# Patient Record
Sex: MALE | Race: WHITE | NOT HISPANIC OR LATINO | Employment: OTHER | URBAN - METROPOLITAN AREA
[De-identification: names, ages, dates, MRNs, and addresses within clinical notes are randomized per-mention and may not be internally consistent; named-entity substitution may affect disease eponyms.]

---

## 2020-01-01 ENCOUNTER — CLINICAL SUPPORT (OUTPATIENT)
Dept: CARDIOLOGY | Facility: HOSPITAL | Age: 75
DRG: 811 | End: 2020-01-01
Attending: INTERNAL MEDICINE
Payer: MEDICARE

## 2020-01-01 ENCOUNTER — HOSPITAL ENCOUNTER (INPATIENT)
Facility: HOSPITAL | Age: 75
LOS: 4 days | Discharge: LEFT BEFORE DISCHARGE | DRG: 811 | End: 2020-09-02
Attending: EMERGENCY MEDICINE | Admitting: INTERNAL MEDICINE
Payer: MEDICARE

## 2020-01-01 VITALS
DIASTOLIC BLOOD PRESSURE: 65 MMHG | HEART RATE: 110 BPM | RESPIRATION RATE: 18 BRPM | SYSTOLIC BLOOD PRESSURE: 147 MMHG | BODY MASS INDEX: 21.72 KG/M2 | HEIGHT: 70 IN | TEMPERATURE: 98 F | OXYGEN SATURATION: 96 % | WEIGHT: 151.69 LBS

## 2020-01-01 DIAGNOSIS — D52.0 ANEMIA, MACROCYTIC, NUTRITIONAL: ICD-10-CM

## 2020-01-01 DIAGNOSIS — R07.9 CHEST PAIN: Primary | ICD-10-CM

## 2020-01-01 DIAGNOSIS — K92.2 GASTROINTESTINAL HEMORRHAGE, UNSPECIFIED GASTROINTESTINAL HEMORRHAGE TYPE: ICD-10-CM

## 2020-01-01 DIAGNOSIS — D75.89 BICYTOPENIA: ICD-10-CM

## 2020-01-01 DIAGNOSIS — D69.6 THROMBOCYTOPENIA: ICD-10-CM

## 2020-01-01 DIAGNOSIS — I21.4 NSTEMI (NON-ST ELEVATED MYOCARDIAL INFARCTION): ICD-10-CM

## 2020-01-01 DIAGNOSIS — D72.89 ATYPICAL LYMPHOCYTES PRESENT ON PERIPHERAL BLOOD SMEAR: ICD-10-CM

## 2020-01-01 DIAGNOSIS — D64.9 ANEMIA, UNSPECIFIED TYPE: ICD-10-CM

## 2020-01-01 DIAGNOSIS — N17.9 ACUTE KIDNEY INJURY: ICD-10-CM

## 2020-01-01 DIAGNOSIS — D75.89 BICYTOPENIA: Primary | ICD-10-CM

## 2020-01-01 DIAGNOSIS — R63.4 WEIGHT LOSS: ICD-10-CM

## 2020-01-01 DIAGNOSIS — R94.31 ABNORMAL EKG: ICD-10-CM

## 2020-01-01 DIAGNOSIS — R13.10 DYSPHAGIA, UNSPECIFIED TYPE: ICD-10-CM

## 2020-01-01 DIAGNOSIS — D51.8 ANEMIA OF DECREASED VITAMIN B12 ABSORPTION: ICD-10-CM

## 2020-01-01 LAB
ABO + RH BLD: NORMAL
ALBUMIN SERPL BCP-MCNC: 1.8 G/DL (ref 3.5–5.2)
ALBUMIN SERPL BCP-MCNC: 2 G/DL (ref 3.5–5.2)
ALBUMIN SERPL BCP-MCNC: 2.2 G/DL (ref 3.5–5.2)
ALBUMIN SERPL ELPH-MCNC: 2.2 G/DL (ref 2.9–4.4)
ALBUMIN/GLOB SERPL: 0.3 {RATIO} (ref 0.7–1.7)
ALP SERPL-CCNC: 170 U/L (ref 55–135)
ALP SERPL-CCNC: 182 U/L (ref 55–135)
ALP SERPL-CCNC: 213 U/L (ref 55–135)
ALPHA1 GLOB SERPL ELPH-MCNC: 0.2 G/DL (ref 0–0.4)
ALPHA2 GLOB SERPL ELPH-MCNC: 0.8 G/DL (ref 0.4–1)
ALT SERPL W/O P-5'-P-CCNC: 17 U/L (ref 10–44)
ALT SERPL W/O P-5'-P-CCNC: 18 U/L (ref 10–44)
ALT SERPL W/O P-5'-P-CCNC: 21 U/L (ref 10–44)
ANA TITR SER IF: NEGATIVE {TITER}
ANION GAP SERPL CALC-SCNC: 5 MMOL/L (ref 8–16)
ANION GAP SERPL CALC-SCNC: 5 MMOL/L (ref 8–16)
ANION GAP SERPL CALC-SCNC: 6 MMOL/L (ref 8–16)
ANISOCYTOSIS BLD QL SMEAR: SLIGHT
AORTIC ROOT ANNULUS: 2.52 CM
AORTIC VALVE CUSP SEPERATION: 1.46 CM
AST SERPL-CCNC: 26 U/L (ref 10–40)
AST SERPL-CCNC: 30 U/L (ref 10–40)
AST SERPL-CCNC: 30 U/L (ref 10–40)
AV INDEX (PROSTH): 0.49
AV MEAN GRADIENT: 12 MMHG
AV PEAK GRADIENT: 23 MMHG
AV VALVE AREA: 1.63 CM2
AV VELOCITY RATIO: 47.67
B-GLOBULIN SERPL ELPH-MCNC: 1.4 G/DL (ref 0.7–1.3)
BACTERIA #/AREA URNS HPF: NEGATIVE /HPF
BACTERIA #/AREA URNS HPF: NEGATIVE /HPF
BACTERIA UR CULT: NO GROWTH
BASOPHILS NFR BLD: 0 % (ref 0–1.9)
BILIRUB SERPL-MCNC: 0.7 MG/DL (ref 0.1–1)
BILIRUB SERPL-MCNC: 0.7 MG/DL (ref 0.1–1)
BILIRUB SERPL-MCNC: 0.9 MG/DL (ref 0.1–1)
BLD GP AB SCN CELLS X3 SERPL QL: NORMAL
BLD PROD TYP BPU: NORMAL
BLOOD UNIT EXPIRATION DATE: NORMAL
BLOOD UNIT TYPE CODE: 5100
BLOOD UNIT TYPE: NORMAL
BNP SERPL-MCNC: 234 PG/ML (ref 0–99)
BSA FOR ECHO PROCEDURE: 1.82 M2
BUN SERPL-MCNC: 23 MG/DL (ref 8–23)
BUN SERPL-MCNC: 25 MG/DL (ref 8–23)
BUN SERPL-MCNC: 31 MG/DL (ref 8–23)
CALCIUM SERPL-MCNC: 8 MG/DL (ref 8.7–10.5)
CALCIUM SERPL-MCNC: 8.1 MG/DL (ref 8.7–10.5)
CALCIUM SERPL-MCNC: 8.3 MG/DL (ref 8.7–10.5)
CANCER AG125 SERPL-ACNC: 20.2 U/ML
CEA SERPL-MCNC: 1.6 NG/ML (ref 0–5)
CHLORIDE SERPL-SCNC: 101 MMOL/L (ref 95–110)
CHLORIDE SERPL-SCNC: 105 MMOL/L (ref 95–110)
CHLORIDE SERPL-SCNC: 108 MMOL/L (ref 95–110)
CHOLEST SERPL-MCNC: 84 MG/DL (ref 120–199)
CHOLEST/HDLC SERPL: 6 {RATIO} (ref 2–5)
CK MB SERPL-MCNC: 1.4 NG/ML (ref 0.1–6.5)
CK SERPL-CCNC: 13 U/L (ref 20–200)
CO2 SERPL-SCNC: 20 MMOL/L (ref 23–29)
CO2 SERPL-SCNC: 22 MMOL/L (ref 23–29)
CO2 SERPL-SCNC: 25 MMOL/L (ref 23–29)
CODING SYSTEM: NORMAL
CREAT SERPL-MCNC: 1.5 MG/DL (ref 0.5–1.4)
CREAT SERPL-MCNC: 1.5 MG/DL (ref 0.5–1.4)
CREAT SERPL-MCNC: 2 MG/DL (ref 0.5–1.4)
CRP SERPL-MCNC: 3.96 MG/DL
CV ECHO LV RWT: 0.31 CM
DAT IGG-SP REAG RBC-IMP: NORMAL
DIFFERENTIAL METHOD: ABNORMAL
DISPENSE STATUS: NORMAL
DOP CALC AO PEAK VEL: 2.41 M/S
DOP CALC AO VTI: 52.11 CM
DOP CALC LVOT AREA: 3.3 CM2
DOP CALC LVOT DIAMETER: 2.05 CM
DOP CALC LVOT PEAK VEL: 114.89 M/S
DOP CALC LVOT STROKE VOLUME: 84.78 CM3
DOP CALCLVOT PEAK VEL VTI: 25.7 CM
E WAVE DECELERATION TIME: 258.99 MSEC
E/A RATIO: 1.03
E/E' RATIO: 12.6 M/S
ECHO LV POSTERIOR WALL: 0.96 CM (ref 0.6–1.1)
EOSINOPHIL NFR BLD: 0 % (ref 0–8)
EPO SERPL-ACNC: 106.1 MIU/ML (ref 2.6–18.5)
ERYTHROCYTE [DISTWIDTH] IN BLOOD BY AUTOMATED COUNT: 20.8 % (ref 11.5–14.5)
ERYTHROCYTE [DISTWIDTH] IN BLOOD BY AUTOMATED COUNT: 20.8 % (ref 11.5–14.5)
ERYTHROCYTE [DISTWIDTH] IN BLOOD BY AUTOMATED COUNT: 21.4 % (ref 11.5–14.5)
ERYTHROCYTE [DISTWIDTH] IN BLOOD BY AUTOMATED COUNT: 21.4 % (ref 11.5–14.5)
ERYTHROCYTE [DISTWIDTH] IN BLOOD BY AUTOMATED COUNT: 21.5 % (ref 11.5–14.5)
ERYTHROCYTE [DISTWIDTH] IN BLOOD BY AUTOMATED COUNT: 21.9 % (ref 11.5–14.5)
EST. GFR  (AFRICAN AMERICAN): 37.2 ML/MIN/1.73 M^2
EST. GFR  (AFRICAN AMERICAN): 52.6 ML/MIN/1.73 M^2
EST. GFR  (AFRICAN AMERICAN): 52.6 ML/MIN/1.73 M^2
EST. GFR  (NON AFRICAN AMERICAN): 32.2 ML/MIN/1.73 M^2
EST. GFR  (NON AFRICAN AMERICAN): 45.5 ML/MIN/1.73 M^2
EST. GFR  (NON AFRICAN AMERICAN): 45.5 ML/MIN/1.73 M^2
FERRITIN SERPL-MCNC: 211 NG/ML (ref 20–300)
FOLATE SERPL-MCNC: 19.3 NG/ML (ref 4–24)
FRACTIONAL SHORTENING: 19 % (ref 28–44)
GAMMA GLOB SERPL ELPH-MCNC: 4.2 G/DL (ref 0.4–1.8)
GLOBULIN SER CALC-MCNC: 6.6 G/DL (ref 2.2–3.9)
GLUCOSE SERPL-MCNC: 104 MG/DL (ref 70–110)
GLUCOSE SERPL-MCNC: 108 MG/DL (ref 70–110)
GLUCOSE SERPL-MCNC: 111 MG/DL (ref 70–110)
GLUCOSE SERPL-MCNC: 114 MG/DL (ref 70–110)
GLUCOSE SERPL-MCNC: 114 MG/DL (ref 70–110)
GLUCOSE SERPL-MCNC: 142 MG/DL (ref 70–110)
GLUCOSE SERPL-MCNC: 143 MG/DL (ref 70–110)
GLUCOSE SERPL-MCNC: 210 MG/DL (ref 70–110)
GLUCOSE SERPL-MCNC: 215 MG/DL (ref 70–110)
GLUCOSE SERPL-MCNC: 81 MG/DL (ref 70–110)
GLUCOSE SERPL-MCNC: 89 MG/DL (ref 70–110)
GLUCOSE SERPL-MCNC: 89 MG/DL (ref 70–110)
GLUCOSE SERPL-MCNC: 90 MG/DL (ref 70–110)
GLUCOSE SERPL-MCNC: 99 MG/DL (ref 70–110)
HAPTOGLOB SERPL-MCNC: 237 MG/DL (ref 34–355)
HCT VFR BLD AUTO: 21.8 % (ref 40–54)
HCT VFR BLD AUTO: 24.2 % (ref 40–54)
HCT VFR BLD AUTO: 25.2 % (ref 40–54)
HCT VFR BLD AUTO: 25.5 % (ref 40–54)
HCT VFR BLD AUTO: 25.7 % (ref 40–54)
HCT VFR BLD AUTO: 25.9 % (ref 40–54)
HCT VFR BLD AUTO: 26.6 % (ref 40–54)
HDLC SERPL-MCNC: 14 MG/DL (ref 40–75)
HDLC SERPL: 16.7 % (ref 20–50)
HGB BLD-MCNC: 6.9 G/DL (ref 14–18)
HGB BLD-MCNC: 7.8 G/DL (ref 14–18)
HGB BLD-MCNC: 8.1 G/DL (ref 14–18)
HGB BLD-MCNC: 8.1 G/DL (ref 14–18)
HGB BLD-MCNC: 8.2 G/DL (ref 14–18)
HGB BLD-MCNC: 8.2 G/DL (ref 14–18)
HGB BLD-MCNC: 8.4 G/DL (ref 14–18)
HYALINE CASTS #/AREA URNS LPF: 5 /LPF
HYALINE CASTS #/AREA URNS LPF: 5 /LPF
IGA SERPL-MCNC: 1227 MG/DL (ref 61–437)
IGA SERPL-MCNC: 1227 MG/DL (ref 61–437)
IGG SERPL-MCNC: 4513 MG/DL (ref 603–1613)
IGG SERPL-MCNC: 4513 MG/DL (ref 603–1613)
IGM SERPL-MCNC: 45 MG/DL (ref 15–143)
IGM SERPL-MCNC: 45 MG/DL (ref 15–143)
IMM GRANULOCYTES # BLD AUTO: ABNORMAL K/UL (ref 0–0.04)
IMM GRANULOCYTES NFR BLD AUTO: ABNORMAL % (ref 0–0.5)
INTERVENTRICULAR SEPTUM: 0.96 CM (ref 0.6–1.1)
IRON SERPL-MCNC: 164 UG/DL (ref 45–160)
KAPPA LC FREE SER-MCNC: 520.4 MG/L (ref 3.3–19.4)
KAPPA LC FREE/LAMBDA FREE SER: 1.8 {RATIO} (ref 0.26–1.65)
LABCORP MISC TEST CODE: 1453
LABCORP MISC TEST NAME: NORMAL
LABCORP MISCELLANEOUS TEST: NORMAL
LABORATORY COMMENT REPORT: ABNORMAL
LAMBDA LC FREE SERPL-MCNC: 289.5 MG/L (ref 5.7–26.3)
LDH SERPL L TO P-CCNC: 259 U/L (ref 110–260)
LDLC SERPL CALC-MCNC: 43.2 MG/DL (ref 63–159)
LEFT ATRIUM SIZE: 4.59 CM
LEFT INTERNAL DIMENSION IN SYSTOLE: 4.94 CM (ref 2.1–4)
LEFT VENTRICLE MASS INDEX: 131 G/M2
LEFT VENTRICULAR INTERNAL DIMENSION IN DIASTOLE: 6.1 CM (ref 3.5–6)
LEFT VENTRICULAR MASS: 240.94 G
LV LATERAL E/E' RATIO: 10.5 M/S
LV SEPTAL E/E' RATIO: 15.75 M/S
LYMPHOCYTES NFR BLD: 0 % (ref 18–48)
LYMPHOCYTES NFR BLD: 58 % (ref 18–48)
LYMPHOCYTES NFR BLD: 67 % (ref 18–48)
LYMPHOCYTES NFR BLD: 67 % (ref 18–48)
LYMPHOCYTES NFR BLD: 68 % (ref 18–48)
LYMPHOCYTES NFR BLD: 77 % (ref 18–48)
M PROTEIN SERPL ELPH-MCNC: 0.9 G/DL
MAGNESIUM SERPL-MCNC: 1.8 MG/DL (ref 1.6–2.6)
MCH RBC QN AUTO: 32.5 PG (ref 27–31)
MCH RBC QN AUTO: 32.9 PG (ref 27–31)
MCH RBC QN AUTO: 33.2 PG (ref 27–31)
MCH RBC QN AUTO: 33.2 PG (ref 27–31)
MCH RBC QN AUTO: 33.3 PG (ref 27–31)
MCH RBC QN AUTO: 34 PG (ref 27–31)
MCHC RBC AUTO-ENTMCNC: 31.6 G/DL (ref 32–36)
MCHC RBC AUTO-ENTMCNC: 31.7 G/DL (ref 32–36)
MCHC RBC AUTO-ENTMCNC: 31.7 G/DL (ref 32–36)
MCHC RBC AUTO-ENTMCNC: 31.8 G/DL (ref 32–36)
MCHC RBC AUTO-ENTMCNC: 32.1 G/DL (ref 32–36)
MCHC RBC AUTO-ENTMCNC: 32.2 G/DL (ref 32–36)
MCV RBC AUTO: 102 FL (ref 82–98)
MCV RBC AUTO: 103 FL (ref 82–98)
MCV RBC AUTO: 103 FL (ref 82–98)
MCV RBC AUTO: 104 FL (ref 82–98)
MCV RBC AUTO: 105 FL (ref 82–98)
MCV RBC AUTO: 107 FL (ref 82–98)
MICROSCOPIC COMMENT: ABNORMAL
MICROSCOPIC COMMENT: ABNORMAL
MONOCYTES NFR BLD: 0 % (ref 4–15)
MONOCYTES NFR BLD: 2 % (ref 4–15)
MONOCYTES NFR BLD: 5 % (ref 4–15)
MONOCYTES NFR BLD: 6 % (ref 4–15)
MONOCYTES NFR BLD: 8 % (ref 4–15)
MONOCYTES NFR BLD: 8 % (ref 4–15)
MPV, BLUE TOP: 8.7 FL (ref 9.2–12.9)
MV PEAK A VEL: 1.22 M/S
MV PEAK E VEL: 1.26 M/S
NEUTROPHILS NFR BLD: 0 % (ref 38–73)
NEUTROPHILS NFR BLD: 15 % (ref 38–73)
NEUTROPHILS NFR BLD: 18 % (ref 38–73)
NEUTROPHILS NFR BLD: 24 % (ref 38–73)
NEUTROPHILS NFR BLD: 25 % (ref 38–73)
NEUTROPHILS NFR BLD: 34 % (ref 38–73)
NEUTS BAND NFR BLD MANUAL: 2 %
NEUTS BAND NFR BLD MANUAL: 4 %
NEUTS BAND NFR BLD MANUAL: 6 %
NONHDLC SERPL-MCNC: 70 MG/DL
NRBC BLD-RTO: 1 /100 WBC
NRBC BLD-RTO: 2 /100 WBC
NUM UNITS TRANS PACKED RBC: NORMAL
OB PNL STL: NEGATIVE
PISA TR MAX VEL: 3.34 M/S
PLATELET # BLD AUTO: 31 K/UL (ref 150–350)
PLATELET # BLD AUTO: 37 K/UL (ref 150–350)
PLATELET # BLD AUTO: 37 K/UL (ref 150–350)
PLATELET # BLD AUTO: 39 K/UL (ref 150–350)
PLATELET # BLD AUTO: 39 K/UL (ref 150–350)
PLATELET # BLD AUTO: 49 K/UL (ref 150–350)
PLATELET BLD QL SMEAR: ABNORMAL
PLATELET, BLUE TOP: 33 K/UL (ref 150–350)
PMV BLD AUTO: 10.5 FL (ref 9.2–12.9)
PMV BLD AUTO: 10.9 FL (ref 9.2–12.9)
PMV BLD AUTO: 9.2 FL (ref 9.2–12.9)
PMV BLD AUTO: 9.5 FL (ref 9.2–12.9)
PMV BLD AUTO: 9.5 FL (ref 9.2–12.9)
PMV BLD AUTO: 9.9 FL (ref 9.2–12.9)
POTASSIUM SERPL-SCNC: 4.1 MMOL/L (ref 3.5–5.1)
POTASSIUM SERPL-SCNC: 4.4 MMOL/L (ref 3.5–5.1)
POTASSIUM SERPL-SCNC: 4.5 MMOL/L (ref 3.5–5.1)
PROMYELOCYTES NFR BLD MANUAL: 0 %
PROT PATTERN SERPL IFE-IMP: ABNORMAL
PROT SERPL-MCNC: 10.1 G/DL (ref 6–8.4)
PROT SERPL-MCNC: 8.7 G/DL (ref 6–8.4)
PROT SERPL-MCNC: 8.8 G/DL (ref 6–8.5)
PROT SERPL-MCNC: 9.4 G/DL (ref 6–8.4)
PV PEAK VELOCITY: 108.53 CM/S
RA PRESSURE: 3 MMHG
RBC # BLD AUTO: 2.03 M/UL (ref 4.6–6.2)
RBC # BLD AUTO: 2.35 M/UL (ref 4.6–6.2)
RBC # BLD AUTO: 2.44 M/UL (ref 4.6–6.2)
RBC # BLD AUTO: 2.46 M/UL (ref 4.6–6.2)
RBC # BLD AUTO: 2.49 M/UL (ref 4.6–6.2)
RBC # BLD AUTO: 2.55 M/UL (ref 4.6–6.2)
RBC #/AREA URNS HPF: 4 /HPF (ref 0–4)
RBC #/AREA URNS HPF: 4 /HPF (ref 0–4)
RETICS/RBC NFR AUTO: 4.1 % (ref 0.4–2)
RHEUMATOID FACT SERPL-ACNC: <10 IU/ML (ref 0–13.9)
SARS-COV-2 RDRP RESP QL NAA+PROBE: NEGATIVE
SATURATED IRON: 92 % (ref 20–50)
SMUDGE CELLS BLD QL SMEAR: PRESENT
SODIUM SERPL-SCNC: 131 MMOL/L (ref 136–145)
SODIUM SERPL-SCNC: 133 MMOL/L (ref 136–145)
SODIUM SERPL-SCNC: 133 MMOL/L (ref 136–145)
SQUAMOUS #/AREA URNS HPF: 1 /HPF
SQUAMOUS #/AREA URNS HPF: 1 /HPF
TDI LATERAL: 0.12 M/S
TDI SEPTAL: 0.08 M/S
TDI: 0.1 M/S
TOTAL IRON BINDING CAPACITY: 178 UG/DL (ref 250–450)
TR MAX PG: 45 MMHG
TRANSFERRIN SERPL-MCNC: 127 MG/DL (ref 200–375)
TRIGL SERPL-MCNC: 134 MG/DL (ref 30–150)
TROPONIN I SERPL DL<=0.01 NG/ML-MCNC: 0.06 NG/ML
TROPONIN I SERPL DL<=0.01 NG/ML-MCNC: 0.07 NG/ML
TROPONIN I SERPL DL<=0.01 NG/ML-MCNC: 0.1 NG/ML
TSH SERPL DL<=0.005 MIU/L-ACNC: 4.76 UIU/ML (ref 0.34–5.6)
TV REST PULMONARY ARTERY PRESSURE: 48 MMHG
VIT B12 SERPL-MCNC: 213 PG/ML (ref 210–950)
VIT B6 SERPL-MCNC: 2.6 UG/L (ref 5.3–46.7)
WBC # BLD AUTO: 11.38 K/UL (ref 3.9–12.7)
WBC # BLD AUTO: 6.53 K/UL (ref 3.9–12.7)
WBC # BLD AUTO: 6.78 K/UL (ref 3.9–12.7)
WBC # BLD AUTO: 6.84 K/UL (ref 3.9–12.7)
WBC # BLD AUTO: 6.88 K/UL (ref 3.9–12.7)
WBC # BLD AUTO: 9.2 K/UL (ref 3.9–12.7)
WBC #/AREA URNS HPF: 1 /HPF (ref 0–5)
WBC #/AREA URNS HPF: 1 /HPF (ref 0–5)
WBC OTHER NFR BLD MANUAL: 6 %

## 2020-01-01 PROCEDURE — 84443 ASSAY THYROID STIM HORMONE: CPT

## 2020-01-01 PROCEDURE — 85018 HEMOGLOBIN: CPT

## 2020-01-01 PROCEDURE — 84484 ASSAY OF TROPONIN QUANT: CPT | Mod: 91

## 2020-01-01 PROCEDURE — 85007 BL SMEAR W/DIFF WBC COUNT: CPT

## 2020-01-01 PROCEDURE — 85045 AUTOMATED RETICULOCYTE COUNT: CPT

## 2020-01-01 PROCEDURE — 85027 COMPLETE CBC AUTOMATED: CPT

## 2020-01-01 PROCEDURE — 25000003 PHARM REV CODE 250: Performed by: EMERGENCY MEDICINE

## 2020-01-01 PROCEDURE — 82784 ASSAY IGA/IGD/IGG/IGM EACH: CPT | Mod: 91

## 2020-01-01 PROCEDURE — C9113 INJ PANTOPRAZOLE SODIUM, VIA: HCPCS | Performed by: INTERNAL MEDICINE

## 2020-01-01 PROCEDURE — 63600175 PHARM REV CODE 636 W HCPCS: Performed by: INTERNAL MEDICINE

## 2020-01-01 PROCEDURE — 87086 URINE CULTURE/COLONY COUNT: CPT

## 2020-01-01 PROCEDURE — 93306 ECHO (CUPID ONLY): ICD-10-PCS | Mod: 26,,, | Performed by: INTERNAL MEDICINE

## 2020-01-01 PROCEDURE — 85014 HEMATOCRIT: CPT

## 2020-01-01 PROCEDURE — 25000003 PHARM REV CODE 250: Performed by: INTERNAL MEDICINE

## 2020-01-01 PROCEDURE — 21400001 HC TELEMETRY ROOM

## 2020-01-01 PROCEDURE — 30000890 HC MISC. SEND OUT TEST

## 2020-01-01 PROCEDURE — 80053 COMPREHEN METABOLIC PANEL: CPT

## 2020-01-01 PROCEDURE — 84207 ASSAY OF VITAMIN B-6: CPT

## 2020-01-01 PROCEDURE — 82962 GLUCOSE BLOOD TEST: CPT

## 2020-01-01 PROCEDURE — 86038 ANTINUCLEAR ANTIBODIES: CPT

## 2020-01-01 PROCEDURE — 84165 PROTEIN E-PHORESIS SERUM: CPT

## 2020-01-01 PROCEDURE — 85397 CLOTTING FUNCT ACTIVITY: CPT

## 2020-01-01 PROCEDURE — 83010 ASSAY OF HAPTOGLOBIN QUANT: CPT

## 2020-01-01 PROCEDURE — 36415 COLL VENOUS BLD VENIPUNCTURE: CPT

## 2020-01-01 PROCEDURE — 83036 HEMOGLOBIN GLYCOSYLATED A1C: CPT

## 2020-01-01 PROCEDURE — 82668 ASSAY OF ERYTHROPOIETIN: CPT

## 2020-01-01 PROCEDURE — P9016 RBC LEUKOCYTES REDUCED: HCPCS

## 2020-01-01 PROCEDURE — 99285 EMERGENCY DEPT VISIT HI MDM: CPT | Mod: 25

## 2020-01-01 PROCEDURE — 86431 RHEUMATOID FACTOR QUANT: CPT

## 2020-01-01 PROCEDURE — 36430 TRANSFUSION BLD/BLD COMPNT: CPT

## 2020-01-01 PROCEDURE — 83883 ASSAY NEPHELOMETRY NOT SPEC: CPT | Mod: 91

## 2020-01-01 PROCEDURE — 93005 ELECTROCARDIOGRAM TRACING: CPT | Performed by: INTERNAL MEDICINE

## 2020-01-01 PROCEDURE — 86023 IMMUNOGLOBULIN ASSAY: CPT

## 2020-01-01 PROCEDURE — 83540 ASSAY OF IRON: CPT

## 2020-01-01 PROCEDURE — 85027 COMPLETE CBC AUTOMATED: CPT | Mod: 91

## 2020-01-01 PROCEDURE — 82728 ASSAY OF FERRITIN: CPT

## 2020-01-01 PROCEDURE — 86140 C-REACTIVE PROTEIN: CPT

## 2020-01-01 PROCEDURE — 93306 TTE W/DOPPLER COMPLETE: CPT

## 2020-01-01 PROCEDURE — 81001 URINALYSIS AUTO W/SCOPE: CPT

## 2020-01-01 PROCEDURE — 85049 AUTOMATED PLATELET COUNT: CPT

## 2020-01-01 PROCEDURE — 82272 OCCULT BLD FECES 1-3 TESTS: CPT

## 2020-01-01 PROCEDURE — 83880 ASSAY OF NATRIURETIC PEPTIDE: CPT

## 2020-01-01 PROCEDURE — 86901 BLOOD TYPING SEROLOGIC RH(D): CPT

## 2020-01-01 PROCEDURE — 30000890 MISCELLANEOUS SENDOUT TEST, BLOOD

## 2020-01-01 PROCEDURE — 82607 VITAMIN B-12: CPT

## 2020-01-01 PROCEDURE — 86022 PLATELET ANTIBODIES: CPT | Mod: 59

## 2020-01-01 PROCEDURE — 82746 ASSAY OF FOLIC ACID SERUM: CPT

## 2020-01-01 PROCEDURE — 86920 COMPATIBILITY TEST SPIN: CPT

## 2020-01-01 PROCEDURE — U0002 COVID-19 LAB TEST NON-CDC: HCPCS

## 2020-01-01 PROCEDURE — 82378 CARCINOEMBRYONIC ANTIGEN: CPT

## 2020-01-01 PROCEDURE — 93306 TTE W/DOPPLER COMPLETE: CPT | Mod: 26,,, | Performed by: INTERNAL MEDICINE

## 2020-01-01 PROCEDURE — 85025 COMPLETE CBC W/AUTO DIFF WBC: CPT

## 2020-01-01 PROCEDURE — 63600175 PHARM REV CODE 636 W HCPCS: Performed by: EMERGENCY MEDICINE

## 2020-01-01 PROCEDURE — 86022 PLATELET ANTIBODIES: CPT

## 2020-01-01 PROCEDURE — 83615 LACTATE (LD) (LDH) ENZYME: CPT

## 2020-01-01 PROCEDURE — 80061 LIPID PANEL: CPT

## 2020-01-01 PROCEDURE — 86880 COOMBS TEST DIRECT: CPT

## 2020-01-01 PROCEDURE — 83735 ASSAY OF MAGNESIUM: CPT

## 2020-01-01 PROCEDURE — 82550 ASSAY OF CK (CPK): CPT

## 2020-01-01 PROCEDURE — 82553 CREATINE MB FRACTION: CPT

## 2020-01-01 PROCEDURE — 86304 IMMUNOASSAY TUMOR CA 125: CPT

## 2020-01-01 PROCEDURE — 30000890 LABCORP MISCELLANEOUS TEST

## 2020-01-01 RX ORDER — GLUCAGON 1 MG
1 KIT INJECTION
Status: DISCONTINUED | OUTPATIENT
Start: 2020-01-01 | End: 2020-01-01 | Stop reason: HOSPADM

## 2020-01-01 RX ORDER — PANTOPRAZOLE SODIUM 40 MG/10ML
40 INJECTION, POWDER, LYOPHILIZED, FOR SOLUTION INTRAVENOUS DAILY
Status: DISCONTINUED | OUTPATIENT
Start: 2020-01-01 | End: 2020-01-01 | Stop reason: HOSPADM

## 2020-01-01 RX ORDER — ACETAMINOPHEN 325 MG/1
650 TABLET ORAL EVERY 8 HOURS PRN
Status: DISCONTINUED | OUTPATIENT
Start: 2020-01-01 | End: 2020-01-01 | Stop reason: HOSPADM

## 2020-01-01 RX ORDER — ATORVASTATIN CALCIUM 40 MG/1
40 TABLET, FILM COATED ORAL DAILY
Status: DISCONTINUED | OUTPATIENT
Start: 2020-01-01 | End: 2020-01-01 | Stop reason: HOSPADM

## 2020-01-01 RX ORDER — CYANOCOBALAMIN 1000 UG/ML
1000 INJECTION, SOLUTION INTRAMUSCULAR; SUBCUTANEOUS DAILY
Status: COMPLETED | OUTPATIENT
Start: 2020-01-01 | End: 2020-01-01

## 2020-01-01 RX ORDER — SODIUM CHLORIDE 9 MG/ML
INJECTION, SOLUTION INTRAVENOUS CONTINUOUS
Status: DISCONTINUED | OUTPATIENT
Start: 2020-01-01 | End: 2020-01-01

## 2020-01-01 RX ORDER — INSULIN ASPART 100 [IU]/ML
1-10 INJECTION, SOLUTION INTRAVENOUS; SUBCUTANEOUS
Status: DISCONTINUED | OUTPATIENT
Start: 2020-01-01 | End: 2020-01-01 | Stop reason: HOSPADM

## 2020-01-01 RX ORDER — HYDROCODONE BITARTRATE AND ACETAMINOPHEN 500; 5 MG/1; MG/1
TABLET ORAL
Status: DISCONTINUED | OUTPATIENT
Start: 2020-01-01 | End: 2020-01-01 | Stop reason: HOSPADM

## 2020-01-01 RX ORDER — IBUPROFEN 200 MG
24 TABLET ORAL
Status: DISCONTINUED | OUTPATIENT
Start: 2020-01-01 | End: 2020-01-01 | Stop reason: HOSPADM

## 2020-01-01 RX ORDER — CYANOCOBALAMIN 1000 UG/ML
1000 INJECTION, SOLUTION INTRAMUSCULAR; SUBCUTANEOUS DAILY
Status: DISCONTINUED | OUTPATIENT
Start: 2020-01-01 | End: 2020-01-01 | Stop reason: HOSPADM

## 2020-01-01 RX ORDER — TRAMADOL HYDROCHLORIDE 50 MG/1
50 TABLET ORAL EVERY 4 HOURS PRN
Status: DISCONTINUED | OUTPATIENT
Start: 2020-01-01 | End: 2020-01-01 | Stop reason: HOSPADM

## 2020-01-01 RX ORDER — METOPROLOL TARTRATE 50 MG/1
100 TABLET ORAL DAILY
Status: DISCONTINUED | OUTPATIENT
Start: 2020-01-01 | End: 2020-01-01 | Stop reason: HOSPADM

## 2020-01-01 RX ORDER — ASPIRIN 325 MG
325 TABLET ORAL
Status: COMPLETED | OUTPATIENT
Start: 2020-01-01 | End: 2020-01-01

## 2020-01-01 RX ORDER — SODIUM CHLORIDE 0.9 % (FLUSH) 0.9 %
10 SYRINGE (ML) INJECTION
Status: DISCONTINUED | OUTPATIENT
Start: 2020-01-01 | End: 2020-01-01 | Stop reason: HOSPADM

## 2020-01-01 RX ORDER — AMLODIPINE BESYLATE 5 MG/1
10 TABLET ORAL DAILY
Status: DISCONTINUED | OUTPATIENT
Start: 2020-01-01 | End: 2020-01-01 | Stop reason: HOSPADM

## 2020-01-01 RX ORDER — SODIUM CHLORIDE, SODIUM LACTATE, POTASSIUM CHLORIDE, CALCIUM CHLORIDE 600; 310; 30; 20 MG/100ML; MG/100ML; MG/100ML; MG/100ML
1000 INJECTION, SOLUTION INTRAVENOUS
Status: COMPLETED | OUTPATIENT
Start: 2020-01-01 | End: 2020-01-01

## 2020-01-01 RX ORDER — AMLODIPINE BESYLATE 10 MG/1
10 TABLET ORAL DAILY
COMMUNITY

## 2020-01-01 RX ORDER — IBUPROFEN 200 MG
16 TABLET ORAL
Status: DISCONTINUED | OUTPATIENT
Start: 2020-01-01 | End: 2020-01-01 | Stop reason: HOSPADM

## 2020-01-01 RX ADMIN — CYANOCOBALAMIN 1000 MCG: 1000 INJECTION, SOLUTION INTRAMUSCULAR at 09:08

## 2020-01-01 RX ADMIN — AMLODIPINE BESYLATE 10 MG: 5 TABLET ORAL at 08:08

## 2020-01-01 RX ADMIN — PANTOPRAZOLE SODIUM 40 MG: 40 INJECTION, POWDER, LYOPHILIZED, FOR SOLUTION INTRAVENOUS at 06:08

## 2020-01-01 RX ADMIN — INSULIN ASPART 4 UNITS: 100 INJECTION, SOLUTION INTRAVENOUS; SUBCUTANEOUS at 12:09

## 2020-01-01 RX ADMIN — ATORVASTATIN CALCIUM 40 MG: 40 TABLET, FILM COATED ORAL at 08:08

## 2020-01-01 RX ADMIN — METOPROLOL TARTRATE 100 MG: 50 TABLET, FILM COATED ORAL at 09:08

## 2020-01-01 RX ADMIN — METOPROLOL TARTRATE 100 MG: 50 TABLET, FILM COATED ORAL at 09:09

## 2020-01-01 RX ADMIN — PANTOPRAZOLE SODIUM 40 MG: 40 INJECTION, POWDER, LYOPHILIZED, FOR SOLUTION INTRAVENOUS at 09:09

## 2020-01-01 RX ADMIN — ACETAMINOPHEN 650 MG: 325 TABLET, FILM COATED ORAL at 08:09

## 2020-01-01 RX ADMIN — ATORVASTATIN CALCIUM 40 MG: 40 TABLET, FILM COATED ORAL at 09:09

## 2020-01-01 RX ADMIN — PANTOPRAZOLE SODIUM 40 MG: 40 INJECTION, POWDER, LYOPHILIZED, FOR SOLUTION INTRAVENOUS at 09:08

## 2020-01-01 RX ADMIN — AMLODIPINE BESYLATE 10 MG: 5 TABLET ORAL at 09:08

## 2020-01-01 RX ADMIN — METOPROLOL TARTRATE 100 MG: 50 TABLET, FILM COATED ORAL at 08:08

## 2020-01-01 RX ADMIN — METOPROLOL TARTRATE 100 MG: 50 TABLET, FILM COATED ORAL at 08:09

## 2020-01-01 RX ADMIN — ATORVASTATIN CALCIUM 40 MG: 40 TABLET, FILM COATED ORAL at 08:09

## 2020-01-01 RX ADMIN — CYANOCOBALAMIN 1000 MCG: 1000 INJECTION, SOLUTION INTRAMUSCULAR at 06:08

## 2020-01-01 RX ADMIN — ATORVASTATIN CALCIUM 40 MG: 40 TABLET, FILM COATED ORAL at 09:08

## 2020-01-01 RX ADMIN — AMLODIPINE BESYLATE 10 MG: 5 TABLET ORAL at 08:09

## 2020-01-01 RX ADMIN — ASPIRIN 325 MG ORAL TABLET 325 MG: 325 PILL ORAL at 01:08

## 2020-01-01 RX ADMIN — PANTOPRAZOLE SODIUM 40 MG: 40 INJECTION, POWDER, LYOPHILIZED, FOR SOLUTION INTRAVENOUS at 08:08

## 2020-01-01 RX ADMIN — CYANOCOBALAMIN 1000 MCG: 1000 INJECTION, SOLUTION INTRAMUSCULAR at 09:09

## 2020-01-01 RX ADMIN — CYANOCOBALAMIN 1000 MCG: 1000 INJECTION, SOLUTION INTRAMUSCULAR at 08:08

## 2020-01-01 RX ADMIN — PANTOPRAZOLE SODIUM 40 MG: 40 INJECTION, POWDER, LYOPHILIZED, FOR SOLUTION INTRAVENOUS at 08:09

## 2020-01-01 RX ADMIN — SODIUM CHLORIDE, SODIUM LACTATE, POTASSIUM CHLORIDE, AND CALCIUM CHLORIDE 1000 ML: .6; .31; .03; .02 INJECTION, SOLUTION INTRAVENOUS at 01:08

## 2020-01-01 RX ADMIN — SODIUM CHLORIDE: 0.9 INJECTION, SOLUTION INTRAVENOUS at 06:08

## 2020-01-01 RX ADMIN — AMLODIPINE BESYLATE 10 MG: 5 TABLET ORAL at 09:09

## 2020-01-01 RX ADMIN — CYANOCOBALAMIN 1000 MCG: 1000 INJECTION, SOLUTION INTRAMUSCULAR at 08:09

## 2020-08-29 PROBLEM — D75.89 BICYTOPENIA: Status: ACTIVE | Noted: 2020-01-01

## 2020-08-29 PROBLEM — I25.10 CAD (CORONARY ARTERY DISEASE): Status: ACTIVE | Noted: 2020-01-01

## 2020-08-29 PROBLEM — R94.31 ABNORMAL EKG: Status: ACTIVE | Noted: 2020-01-01

## 2020-08-29 PROBLEM — R07.9 CHEST PAIN: Status: ACTIVE | Noted: 2020-01-01

## 2020-08-29 PROBLEM — R63.4 WEIGHT LOSS: Status: ACTIVE | Noted: 2020-01-01

## 2020-08-29 PROBLEM — I10 HYPERTENSION: Status: ACTIVE | Noted: 2020-01-01

## 2020-08-29 NOTE — ASSESSMENT & PLAN NOTE
Currently worsening anemia with possible chronic GI bleed given previous large duodenal ulcer   Recent EGD result done by Dr. Galloway is not in the system    Plan   Close monitor hemoglobin  IV PPI  Stool occult blood  May need  GI consult

## 2020-08-29 NOTE — ASSESSMENT & PLAN NOTE
Possible secondary to esophageal problem and  is not eating  R/o cancer     Plan   CEA , , stool occult blood  GI consult for EGD /colonoscopy .  Which also can be done as outpatient

## 2020-08-29 NOTE — HPI
75-year-old male patient with history of Acute GI bleed (duodenal ulcer),Anemia due to acute blood loss s/p PRBC,chronic Thrombocytopenia  ,  Hypertension  ,Hyperlipidemia  ,CAD s/p stent placement 17  Years ago came left-sided chest pain.  He had history of GI bleeding in 2014 and scope was done with large duodenal ulcer but he better since then.  But he was having problem with swallowing and pain in the esophagus and recently did endoscope with Dr. Galloway and found esophageal stricture and also possible esophagitis and started Diflucan and he was feeling better at this moment .  At the same time he was losing a lot of weights nearly 20 lb over few months period .  He also admits that he is under lot of stress and his wife is sick ankle fracture.  His cardiac status was being stable for many years and he was off  on aspirin/Plavix.  His chest pain is on the left side, intermittent, no radiation to the shoulder or the jaw and no association with diaphoresis.  Workup was done in emergency room and showed worsening anemia, new changes in the EKG with nonspecific T-wave inversion and minimal ST depression in anterior leads but no ST elevations.  On examination patient is not in respiratory or painful distress, abdomen is soft nontender and no lymphadenopathies.  He is  a chronic smoker.
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
## 2020-08-29 NOTE — ASSESSMENT & PLAN NOTE
Chronic but worsening . Last plt was 131 down to 49.  Associated with weight loss    Plan   Anemia work up   UA for proteins   Close monitor   Consult dr Carlos   Patient may need bone marrow Bx .   Liver US   Previous CT with no cirrhosis findings

## 2020-08-29 NOTE — ED PROVIDER NOTES
Encounter Date: 8/29/2020       History     Chief Complaint   Patient presents with    Chest Pain     x2 mos, intermittent     75-year-old male with history of hypertension, coronary artery disease, stents x2 placed approximately 17 years ago, esophagitis.  Patient presents emergency department with 1 week history of intermittent midsternal chest pain discomfort.  Patient states feels like pressure-like quality.  He denies shortness of breath, no nausea, no vomiting, no other constitutional symptoms.  Patient states chest pain-free at this time.  Patient is still smokes half pack per day has been doing so greater than 35 years.        Review of patient's allergies indicates:   Allergen Reactions    Lisinopril Hives     Past Medical History:   Diagnosis Date    Anticoagulant long-term use     Coronary artery disease     Encounter for blood transfusion     Hypertension      Past Surgical History:   Procedure Laterality Date    CORONARY ANGIOPLASTY WITH STENT PLACEMENT  2007    stents x2    FRACTURE SURGERY  jaw fx    VASCULAR SURGERY  2 cardiac stents in 2007     Family History   Problem Relation Age of Onset    Hypertension Mother     Stroke Mother      Social History     Tobacco Use    Smoking status: Current Every Day Smoker     Packs/day: 1.00     Years: 45.00     Pack years: 45.00   Substance Use Topics    Alcohol use: Yes     Comment: occasionally    Drug use: No     Review of Systems   Constitutional: Negative for fever.   HENT: Negative for sore throat.    Respiratory: Positive for chest tightness. Negative for shortness of breath.    Cardiovascular: Positive for chest pain.   Gastrointestinal: Negative for nausea and vomiting.   Genitourinary: Negative for dysuria.   Musculoskeletal: Negative for back pain.   Skin: Negative for rash.   Neurological: Negative for weakness.   Hematological: Does not bruise/bleed easily.       Physical Exam     Initial Vitals [08/29/20 1043]   BP Pulse Resp Temp  SpO2   (!) 157/74 104 20 98.3 °F (36.8 °C) --      MAP       --         Physical Exam    Nursing note and vitals reviewed.  Constitutional: He appears well-developed and well-nourished.   HENT:   Head: Normocephalic and atraumatic.   Nose: Nose normal.   Mouth/Throat: Oropharynx is clear and moist.   Eyes: EOM are normal. Pupils are equal, round, and reactive to light. No scleral icterus.       Neck: Normal range of motion. Neck supple.   Cardiovascular: Normal rate, regular rhythm, normal heart sounds and intact distal pulses. Exam reveals no gallop and no friction rub.    No murmur heard.  Pulmonary/Chest: No stridor. No respiratory distress.   course bilateral breath sounds no adventitious sounds   Abdominal: Soft. Bowel sounds are normal. He exhibits no mass. There is no abdominal tenderness. There is no rebound and no guarding.   Musculoskeletal: Normal range of motion. No edema.   Lymphadenopathy:     He has no cervical adenopathy.   Neurological: He is alert and oriented to person, place, and time. He has normal strength and normal reflexes. No cranial nerve deficit or sensory deficit. GCS score is 15. GCS eye subscore is 4. GCS verbal subscore is 5. GCS motor subscore is 6.   Skin: Skin is warm and dry. Capillary refill takes less than 2 seconds. No rash noted.   Psychiatric: He has a normal mood and affect. His behavior is normal. Judgment and thought content normal.         ED Course   Procedures  Labs Reviewed   CBC W/ AUTO DIFFERENTIAL   COMPREHENSIVE METABOLIC PANEL   TROPONIN I   TROPONIN I   B-TYPE NATRIURETIC PEPTIDE   CK   CK-MB   SARS-COV-2 RNA AMPLIFICATION, QUAL     EKG Readings: (Independently Interpreted)   Initial Reading: No STEMI. Rhythm: Normal Sinus Rhythm. Ectopy: No Ectopy. Axis: Normal.   Sinus rhythm, 94, T-wave inversions in leads V4 V5 V6 with flattening in V3     ECG Results          EKG 12-lead (In process)  Result time 08/29/20 10:53:29    In process by Interface, Lab In [facility]  (08/29/20 10:53:29)                 Narrative:    Test Reason : R07.9,    Vent. Rate : 094 BPM     Atrial Rate : 094 BPM     P-R Int : 100 ms          QRS Dur : 086 ms      QT Int : 328 ms       P-R-T Axes : 014 040 085 degrees     QTc Int : 410 ms    Sinus rhythm with short OH  ST depression, consider subendocardial injury  Abnormal ECG  When compared with ECG of 19-JUL-2007 04:17,  OH interval has decreased  Nonspecific T wave abnormality no longer evident in Inferior leads  T wave inversion no longer evident in Anterior-lateral leads    Referred By: [email or web address]   SELF           Confirmed By:                             Imaging Results          X-Ray Chest AP Portable (Final result)  Result time 08/29/20 11:06:30    Final result by Richard Gibson MD (08/29/20 11:06:30)                 Narrative:    Chest single view    Clinical data:Chest pain. Comparison to July 2007.    FINDINGS: AP view of the chest demonstrates no cardiac, pulmonary, or  acute osseous abnormalities. Atherosclerotic calcification of the  aortic arch is noted. Arthritic changes are noted at the left  shoulder.    IMPRESSION:  1. No acute radiographic abnormalities.    Electronically Signed by Dennis Gibson M.D. on 8/29/2020 11:11 AM                               Medical Decision Making:   Initial Assessment:   75-year-old male with history of coronary artery disease, hypertension, esophagitis here with complaint of chest pain intermittently over 1 week.  Differential Diagnosis:   ACS, unstable angina, esophagitis, gastritis  Clinical Tests:   Lab Tests: Ordered and Reviewed  Radiological Study: Ordered and Reviewed  Medical Tests: Ordered and Reviewed                                 Clinical Impression:       ICD-10-CM ICD-9-CM   1. Chest pain  R07.9 786.50   2. Anemia, unspecified type  D64.9 285.9   3. Acute kidney injury  N17.9 584.9                                Bassam Matthews MD  08/29/20 1150
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
## 2020-08-29 NOTE — SUBJECTIVE & OBJECTIVE
Past Medical History:   Diagnosis Date    Anticoagulant long-term use     Coronary artery disease     Encounter for blood transfusion     Hypertension        Past Surgical History:   Procedure Laterality Date    CORONARY ANGIOPLASTY WITH STENT PLACEMENT  2007    stents x2    FRACTURE SURGERY  jaw fx    VASCULAR SURGERY  2 cardiac stents in 2007       Review of patient's allergies indicates:   Allergen Reactions    Lisinopril Hives       No current facility-administered medications on file prior to encounter.      Current Outpatient Medications on File Prior to Encounter   Medication Sig    amLODIPine (NORVASC) 10 MG tablet Take 10 mg by mouth once daily.    atorvastatin (LIPITOR) 40 MG tablet Take 40 mg by mouth once daily.    metoprolol tartrate (LOPRESSOR) 100 MG tablet Take 100 mg by mouth once daily.     pantoprazole (PROTONIX) 40 MG tablet Take 1 tablet (40 mg total) by mouth 2 (two) times daily. (Patient taking differently: Take 40 mg by mouth once daily. )    [DISCONTINUED] lisinopril (PRINIVIL,ZESTRIL) 20 MG tablet Take 20 mg by mouth once daily.     Family History     Problem Relation (Age of Onset)    Cancer Mother, Father    Hypertension Mother    Stroke Mother        Tobacco Use    Smoking status: Current Every Day Smoker     Packs/day: 1.00     Years: 45.00     Pack years: 45.00   Substance and Sexual Activity    Alcohol use: Yes     Comment: occasionally    Drug use: No    Sexual activity: Yes     Review of Systems   Constitutional: Negative for chills.   HENT: Negative for congestion.    Respiratory: Negative for chest tightness.    Cardiovascular: Positive for chest pain.   Gastrointestinal: Negative for abdominal pain and anal bleeding.   Skin: Negative for color change.   Neurological: Negative for dizziness.   Psychiatric/Behavioral: Negative for agitation.     Objective:     Vital Signs (Most Recent):  Temp: 98.3 °F (36.8 °C) (08/29/20 1043)  Pulse: 79 (08/29/20 1220)  Resp: 19  (08/29/20 1220)  BP: (!) 145/68 (08/29/20 1200)  SpO2: 97 % (08/29/20 1220) Vital Signs (24h Range):  Temp:  [98.3 °F (36.8 °C)] 98.3 °F (36.8 °C)  Pulse:  [] 79  Resp:  [16-20] 19  SpO2:  [96 %-97 %] 97 %  BP: (145-157)/(68-74) 145/68     Weight: 68 kg (150 lb)  Body mass index is 21.52 kg/m².    Physical Exam  Constitutional:       General: He is not in acute distress.     Appearance: He is well-developed.   HENT:      Head: Normocephalic.   Neck:      Musculoskeletal: Neck supple.   Cardiovascular:      Rate and Rhythm: Normal rate and regular rhythm.   Pulmonary:      Effort: No respiratory distress.   Abdominal:      General: Abdomen is flat. There is no distension.      Tenderness: There is no abdominal tenderness.   Musculoskeletal: Normal range of motion.   Skin:     General: Skin is warm.      Findings: No rash.   Neurological:      Mental Status: He is alert and oriented to person, place, and time.   Psychiatric:         Mood and Affect: Mood normal.             Significant Labs:   BMP:   Recent Labs   Lab 08/29/20  1045   *   *   K 4.5      CO2 20*   BUN 31*   CREATININE 2.0*   CALCIUM 8.1*     CBC:   Recent Labs   Lab 08/29/20  1045   WBC 9.20   HGB 8.4*   HCT 26.6*   PLT 49*     CMP:   Recent Labs   Lab 08/29/20  1045   *   K 4.5      CO2 20*   *   BUN 31*   CREATININE 2.0*   CALCIUM 8.1*   PROT 10.1*   ALBUMIN 2.2*   BILITOT 0.9   ALKPHOS 213*   AST 30   ALT 21   ANIONGAP 5*   EGFRNONAA 32.2*     Cardiac Markers:   Recent Labs   Lab 08/29/20  1045   *       Significant Imaging: I have reviewed and interpreted all pertinent imaging results/findings within the past 24 hours.      GI work up in 2014    Colonoscopy at [facility]: Upper GI hemorrhage, multiple colon polyps, old blood noted, no diverticulitis noted      US abdomen: Unremarkable abdominal ultrasound     EGD:  -Distal esophageal stricture mild  -Diffuse gastritis  -NSAID gastropathy in  antrum with small ulcer  -Marked erythema of duodenal bulb  -Large ulcer beginning in apex of duodenal bulb and seemed to extend into second part but also appeared to have a post bulbar stricture  -No biopsies taken  -No pictures generated in report because of travel cart malfunction     CT abdomen: Probable duodenitis/PUD. Pancreatic mass or other abnormality is are not seen. Probable small cysts of the kidneys bilaterally are too small to characterize

## 2020-08-29 NOTE — CONSULTS
[facility]  Cardiology  Consult Note    Patient Name: Ankit Uribe  MRN: [id number]  Admission Date: 8/29/2020  Hospital Length of Stay: 0 days  Code Status: Full Code   Attending Provider: Soto Reagan MD   Consulting Provider: Elizabeth Chaidez MD  Primary Care Physician: Ziggy Treviño MD  Principal Problem:Abnormal EKG    Patient information was obtained from patient, past medical records and ER records.     Consults  Subjective:     REASON FOR CONSULT:       HPI:  75-year-old male with a past medical history significant for coronary artery disease status post stent placement, hypertension, peptic ulcer disease with a history of GI bleed presented to the hospital with complaints of intermittent chest pain and weight loss over the past couple of months.  He reportedly had been having difficulty swallowing and he had a GI evaluation and had stricture which was stretched.  Has not seen a cardiologist for years.  He reports that he is going through a lot of stress.  He reportedly has been having chest pain that is located in the substernal region and relieved with belching.  The pain is different from when he had previous myocardial infarction.  Troponin is minimally elevated with no significant rise or fall.  EKG shows anterolateral ST-T changes.    Past Medical History:   Diagnosis Date    Anticoagulant long-term use     Coronary artery disease     Encounter for blood transfusion     Hypertension        Past Surgical History:   Procedure Laterality Date    CORONARY ANGIOPLASTY WITH STENT PLACEMENT  2007    stents x2    FRACTURE SURGERY  jaw fx    VASCULAR SURGERY  2 cardiac stents in 2007       Review of patient's allergies indicates:   Allergen Reactions    Lisinopril Hives       No current facility-administered medications on file prior to encounter.      Current Outpatient Medications on File Prior to Encounter   Medication Sig    amLODIPine (NORVASC) 10 MG tablet Take 10 mg by  mouth once daily.    atorvastatin (LIPITOR) 40 MG tablet Take 40 mg by mouth once daily.    metoprolol tartrate (LOPRESSOR) 100 MG tablet Take 100 mg by mouth once daily.     pantoprazole (PROTONIX) 40 MG tablet Take 1 tablet (40 mg total) by mouth 2 (two) times daily. (Patient taking differently: Take 40 mg by mouth once daily. )    [DISCONTINUED] lisinopril (PRINIVIL,ZESTRIL) 20 MG tablet Take 20 mg by mouth once daily.       Scheduled Meds:   [START ON 8/30/2020] amLODIPine  10 mg Oral Daily    [START ON 8/30/2020] atorvastatin  40 mg Oral Daily    cyanocobalamin  1,000 mcg Subcutaneous Daily    [START ON 8/30/2020] metoprolol tartrate  100 mg Oral Daily    pantoprazole  40 mg Intravenous Daily     Continuous Infusions:   sodium chloride 0.9% 50 mL/hr at 08/29/20 1805     PRN Meds:.acetaminophen, dextrose 50%, dextrose 50%, glucagon (human recombinant), glucose, glucose, insulin aspart U-100, sodium chloride 0.9%    Family History     Problem Relation (Age of Onset)    Cancer Mother, Father    Hypertension Mother    Stroke Mother          Tobacco Use    Smoking status: Current Every Day Smoker     Packs/day: 1.00     Years: 45.00     Pack years: 45.00   Substance and Sexual Activity    Alcohol use: Yes     Comment: occasionally    Drug use: No    Sexual activity: Yes       ROS   No significant headaches or sore throat or runny nose.   No recent changes in vision.   No recent changes in hearing.  No dysphagia or odynophagia.  Reports .   Denies any cough or hemoptysis.   Denies any abdominal pain, nausea, vomiting, diarrhea or constipation.   Denies any dysuria or polyuria.   Denies any fevers or chills.   Denies any recent significant weight changes.   Denies bleeding diathesis    Objective:     Vital Signs (Most Recent):  Temp: 97.8 °F (36.6 °C) (08/29/20 1600)  Pulse: 87 (08/29/20 1600)  Resp: 18 (08/29/20 1600)  BP: 138/60 (08/29/20 1600)  SpO2: 97 % (08/29/20 1600) Vital Signs (24h  Range):  Temp:  [97.8 °F (36.6 °C)-98.3 °F (36.8 °C)] 97.8 °F (36.6 °C)  Pulse:  [] 87  Resp:  [15-20] 18  SpO2:  [92 %-98 %] 97 %  BP: (126-160)/(60-74) 138/60     Weight: 66.8 kg (147 lb 4.3 oz)  Body mass index is 21.13 kg/m².    SpO2: 97 %  O2 Device (Oxygen Therapy): room air      Intake/Output Summary (Last 24 hours) at 8/29/2020 1832  Last data filed at 8/29/2020 1444  Gross per 24 hour   Intake 1000 ml   Output --   Net 1000 ml       Lines/Drains/Airways     Peripheral Intravenous Line                 Peripheral IV - Single Lumen 08/29/20 1045 18 G Right Hand less than 1 day                Physical Exam  HEENT: Normocephalic, atraumatic, PERRL, Conjunctiva pink, no scleral icterus.   CVS: S1S2+, RRR, ESM+, no rubs or gallops, JVP: Normal.  LUNGS: Clear  ABDOMEN: Soft, NT, BS+  EXTREMITIES: No cyanosis, clubbing or edema  NEURO: AAO X 3.     Significant Labs:   BMP:   Recent Labs   Lab 08/29/20  1045   *   *   K 4.5      CO2 20*   BUN 31*   CREATININE 2.0*   CALCIUM 8.1*   , CMP   Recent Labs   Lab 08/29/20  1045   *   K 4.5      CO2 20*   *   BUN 31*   CREATININE 2.0*   CALCIUM 8.1*   PROT 10.1*   ALBUMIN 2.2*   BILITOT 0.9   ALKPHOS 213*   AST 30   ALT 21   ANIONGAP 5*   ESTGFRAFRICA 37.2*   EGFRNONAA 32.2*   , CBC   Recent Labs   Lab 08/29/20  1045   WBC 9.20   HGB 8.4*   HCT 26.6*   PLT 49*   , INR No results for input(s): INR, PROTIME in the last 48 hours., Lipid Panel   Recent Labs   Lab 08/29/20  1525   CHOL 84*   HDL 14*   LDLCALC 43.2*   TRIG 134   CHOLHDL 16.7*    and Troponin   Recent Labs   Lab 08/29/20  1045 08/29/20  1525   TROPONINI 0.074* 0.063*       Significant Imaging: Reviewed  Assessment and Plan:     IMPRESSION:  Atypical chest pain. Different from when he had previous MI.   Mildly elevated troponin. No significant rise or fall. Likely etiology is ARF.   Esophageal candidiasis. Reportedly improving.   H/o CAD s/p PCI.   ARF.   Thrombocytopenia.    Anemia.  Acute on chronic.  Arthritis. Migratory. Etiology?   Weight loss.  Etiology?    RECOMMENDATIONS:  1. Check echo.   2. Patient would need ischemic work up. This could be done as an outpatient after his comorbidities are taken care of.   3.  Continue current medical regimen for now and avoid aspirin or other antiplatelet agents in view of the acute on chronic anemia.    Thank you for your consult. I will follow-up with patient. Please contact us if you have any additional questions.    Elizabeth Chaidez MD  Cardiology   [facility]

## 2020-08-29 NOTE — ASSESSMENT & PLAN NOTE
Nonspecific T inversions and ST elevations in anterior leads associated with positive troponins/NSTEMI    Plan   Aspirin, Lovenox will not able to give because of severe thrombocytopenia , patient was on beta-blocker and statin  Cardiology consult  Lexiscan stress Myoview ordered just in case  Echocardiogram, trend cardiac enzymes

## 2020-08-30 NOTE — PLAN OF CARE
08/30/20 1240   Discharge Assessment   Assessment Type Discharge Planning Assessment   Confirmed/corrected address and phone number on facesheet? Yes   Assessment information obtained from? Patient;Medical Record   Communicated expected length of stay with patient/caregiver no   Prior to hospitilization cognitive status: Alert/Oriented   Prior to hospitalization functional status: Independent   Current cognitive status: Alert/Oriented   Current Functional Status: Independent   Facility Arrived From: Home   Lives With spouse   Able to Return to Prior Arrangements yes   Is patient able to care for self after discharge? Yes   Who are your caregiver(s) and their phone number(s)? Spouse Giovany Uribe [phone]   Readmission Within the Last 30 Days no previous admission in last 30 days   Patient currently being followed by outpatient case management? No   Patient currently receives any other outside agency services? No   Equipment Currently Used at Home none   Part D Coverage [employer]   Do you have any problems affording any of your prescribed medications? No   Is the patient taking medications as prescribed? yes   Does the patient have transportation home? Yes   Transportation Anticipated family or friend will provide   Dialysis Name and Scheduled days N/a   Does the patient receive services at the [facility]? No   Discharge Plan A Home with family   Discharge Plan B Home with family   DME Needed Upon Discharge  none   Patient/Family in Agreement with Plan yes

## 2020-08-30 NOTE — SUBJECTIVE & OBJECTIVE
Interval History:  Overnight H&H dropped to 6.9/21.8 transfuse 1 unit PRBC with appropriate response to hemoglobin hematocrit 8.2/25.7.  Evaluated by GI who recommended outpatient endoscopy and colonoscopy.  Recommended obtaining CT abdomen pelvis to evaluate for possible lymphoma or underlying malignancy.    Seen by cardiology for chest pain.  Recommends continued outpatient workup once more stable in terms of platelets and blood counts.    Patient seen examined.  Ambulating around room without shortness of breath or chest pain.  States that he feels okay denies abdominal pain, nausea, vomiting.    Review of Systems   Per interval history, all other systems reviewed and negative.     Objective:     Vital Signs (Most Recent):  Temp: 98.9 °F (37.2 °C) (08/30/20 1558)  Pulse: 71 (08/30/20 1558)  Resp: 16 (08/30/20 1558)  BP: (!) 150/68 (08/30/20 1558)  SpO2: 97 % (08/30/20 1558) Vital Signs (24h Range):  Temp:  [98 °F (36.7 °C)-98.9 °F (37.2 °C)] 98.9 °F (37.2 °C)  Pulse:  [66-75] 71  Resp:  [16-18] 16  SpO2:  [95 %-98 %] 97 %  BP: (128-157)/(60-70) 150/68     Weight: 66.8 kg (147 lb 4.3 oz)  Body mass index is 21.13 kg/m².    Intake/Output Summary (Last 24 hours) at 8/30/2020 1813  Last data filed at 8/30/2020 1245  Gross per 24 hour   Intake 538.33 ml   Output 800 ml   Net -261.67 ml      Physical Exam  Constitutional:       General: He is not in acute distress.     Appearance: He is well-developed.   HENT:      Head: Normocephalic.   Neck:      Musculoskeletal: Neck supple.   Cardiovascular:      Rate and Rhythm: Normal rate and regular rhythm.   Pulmonary:      Effort: No respiratory distress.   Abdominal:      General: Abdomen is flat. There is no distension.      Tenderness: There is no abdominal tenderness.   Musculoskeletal: Normal range of motion.   Skin:     General: Skin is warm.      Findings: No rash.   Neurological:      Mental Status: He is alert and oriented to person, place, and time.   Psychiatric:          Mood and Affect: Mood normal.         Significant Labs:   BMP:   Recent Labs   Lab 08/30/20  0419   GLU 89   *   K 4.1      CO2 22*   BUN 25*   CREATININE 1.5*   CALCIUM 8.0*     CBC:   Recent Labs   Lab 08/29/20  1045 08/30/20  0419 08/30/20  1547   WBC 9.20 6.84  --    HGB 8.4* 6.9* 8.2*   HCT 26.6* 21.8* 25.7*   PLT 49* 39*  --      Cardiac Markers:   Recent Labs   Lab 08/29/20  1045   *     Coagulation: No results for input(s): PT, INR, APTT in the last 48 hours.  Lactic Acid: No results for input(s): LACTATE in the last 48 hours.  Troponin:   Recent Labs   Lab 08/29/20  1045 08/29/20  1525 08/29/20  2116   TROPONINI 0.074* 0.063* 0.102*     TSH:   Recent Labs   Lab 08/30/20  0419   TSH 4.760     Urine Studies:   Recent Labs   Lab 08/30/20  1248   RBCUA 4  4   WBCUA 1  1   BACTERIA Negative  Negative   SQUAMEPITHEL 1  1   HYALINECASTS 5*  5*     All pertinent labs within the past 24 hours have been reviewed.    Significant Imaging: I have reviewed all pertinent imaging results/findings within the past 24 hours.     Ct ab pelvis  IMPRESSION:  1.  Mild emphysematous lung disease.  2.  Minimal bilateral perihilar groundglass opacities may reflect  minimal pneumonitis, atelectasis, or edema.  3.  Mild mediastinal lymphadenopathy. Shotty periaortic lymph nodes  identified in the abdomen.  4.  Splenomegaly.  5.  Mild focal wall thickening of the posterior bladder wall  measuring up to 8 mm in thickness. Consider further evaluation with  cystoscopy.    echo  Mild left ventricular enlargement.  Normal left ventricular systolic function. The estimated ejection fraction is 55%.  Normal LV diastolic function.  No wall motion abnormalities.  Normal right ventricular systolic function.  Moderate left atrial enlargement.  Mild aortic valve stenosis.  Aortic valve area is 1.63 cm2; peak velocity is 2.41 m/s; mean gradient is 12 mmHg.  Mild-to-moderate mitral regurgitation.  Mild tricuspid  regurgitation.  Normal central venous pressure (3 mmHg).  The estimated PA systolic pressure is 48 mmHg

## 2020-08-30 NOTE — PLAN OF CARE
Problem: Adult Inpatient Plan of Care  Goal: Plan of Care Review  8/30/2020 0507 by Nataliya Florence RN  Outcome: Ongoing, Progressing  8/30/2020 0507 by Nataliya Florence RN  Outcome: Ongoing, Progressing  Goal: Patient-Specific Goal (Individualization)  8/30/2020 0507 by Nataliya Florence RN  Outcome: Ongoing, Progressing  8/30/2020 0507 by Nataliya Florence RN  Outcome: Ongoing, Progressing  Goal: Absence of Hospital-Acquired Illness or Injury  8/30/2020 0507 by Nataliya Florence RN  Outcome: Ongoing, Progressing  8/30/2020 0507 by Nataliya Florence RN  Outcome: Ongoing, Progressing  Goal: Optimal Comfort and Wellbeing  8/30/2020 0507 by Nataliya Florence RN  Outcome: Ongoing, Progressing  8/30/2020 0507 by Nataliya Florence RN  Outcome: Ongoing, Progressing  Goal: Readiness for Transition of Care  8/30/2020 0507 by Nataliya Florence RN  Outcome: Ongoing, Progressing  8/30/2020 0507 by Nataliya Florence RN  Outcome: Ongoing, Progressing  Goal: Rounds/Family Conference  8/30/2020 0507 by Nataliya Florence RN  Outcome: Ongoing, Progressing  8/30/2020 0507 by Nataliya Florence RN  Outcome: Ongoing, Progressing   Pt without sob and cp this shift, following lab

## 2020-08-30 NOTE — CONSULTS
GASTROENTEROLOGY INPATIENT CONSULT NOTE  Patient Name: Ankit Uribe  Patient MRN: [id number]  Patient : 1945    Admit Date: 2020  Service date: 2020    Reason for Consult:  Anemia dysphagia    PCP: Ziggy Treviño MD    Chief Complaint   Patient presents with    Chest Pain     x2 mos, intermittent       HPI: Patient is a 75 y.o. male with PMHx anemia.  Patient has profound macrocytic anemia.  Has recent bout of dysphagia underwent endoscopy which revealed esophagitis with candidiasis.  Iron studies were normal.  Has been taking care of his wife who has been heard.  Has lost about 35 lb.  Has lost his appetite.  Since dilation of his esophagus he is swallowing better but is not gaining weight.  His platelet count is low as well.    Past Medical History:  Past Medical History:   Diagnosis Date    Anticoagulant long-term use     Coronary artery disease     Encounter for blood transfusion     Hypertension         Past Surgical History:  Past Surgical History:   Procedure Laterality Date    CORONARY ANGIOPLASTY WITH STENT PLACEMENT  2007    stents x2    FRACTURE SURGERY  jaw fx    VASCULAR SURGERY  2 cardiac stents in         Home Medications:  Medications Prior to Admission   Medication Sig Dispense Refill Last Dose    amLODIPine (NORVASC) 10 MG tablet Take 10 mg by mouth once daily.   2020 at 08:30    atorvastatin (LIPITOR) 40 MG tablet Take 40 mg by mouth once daily.   2020 at 08:30    metoprolol tartrate (LOPRESSOR) 100 MG tablet Take 100 mg by mouth once daily.    2020 at 08:30    pantoprazole (PROTONIX) 40 MG tablet Take 1 tablet (40 mg total) by mouth 2 (two) times daily. (Patient taking differently: Take 40 mg by mouth once daily. ) 60 tablet 0 2020 at 08:30       Inpatient Medications:   amLODIPine  10 mg Oral Daily    atorvastatin  40 mg Oral Daily    cyanocobalamin  1,000 mcg Subcutaneous Daily    metoprolol tartrate  100 mg Oral Daily    pantoprazole   "40 mg Intravenous Daily     sodium chloride, acetaminophen, dextrose 50%, dextrose 50%, glucagon (human recombinant), glucose, glucose, insulin aspart U-100, sodium chloride 0.9%    Review of patient's allergies indicates:   Allergen Reactions    Lisinopril Hives       Social History:   Social History     Occupational History    Not on file   Tobacco Use    Smoking status: Current Every Day Smoker     Packs/day: 1.00     Years: 45.00     Pack years: 45.00   Substance and Sexual Activity    Alcohol use: Yes     Comment: occasionally    Drug use: No    Sexual activity: Yes       Family History:   Family History   Problem Relation Age of Onset    Hypertension Mother     Stroke Mother     Cancer Mother     Cancer Father        Review of Systems:  A 10 point review of systems was performed and was normal, except as mentioned in the HPI, including constitutional, HEENT, heme, lymph, cardiovascular, respiratory, gastrointestinal, genitourinary, neurologic, endocrine, psychiatric and musculoskeletal.      OBJECTIVE:    Physical Exam:  24 Hour Vital Sign Ranges: Temp:  [97.8 °F (36.6 °C)-98.5 °F (36.9 °C)] 98.5 °F (36.9 °C)  Pulse:  [68-87] 68  Resp:  [15-20] 16  SpO2:  [92 %-98 %] 98 %  BP: (126-160)/(60-72) 151/67  Most recent vitals: BP (!) 151/67 (BP Location: Right arm, Patient Position: Lying)   Pulse 68   Temp 98.5 °F (36.9 °C) (Oral)   Resp 16   Ht 5' 10" (1.778 m)   Wt 66.8 kg (147 lb 4.3 oz)   SpO2 98%   BMI 21.13 kg/m²    GEN: well-developed, well-nourished, awake and alert, non-toxic appearing adult  HEENT: PERRL, sclera anicteric, oral mucosa pink and moist without lesion  NECK: trachea midline; Good ROM  CV: regular rate and rhythm, no murmurs or gallops  RESP: clear to auscultation bilaterally, no wheezes, rhonci or rales  ABD: soft, non-tender, non-distended, normal bowel sounds  EXT: no swelling or edema, 2+ pulses distally  SKIN: no rashes or jaundice  PSYCH: normal affect    Labs:   Recent " Labs     08/29/20  1045 08/30/20  0419   WBC 9.20 6.84   * 107*   PLT 49* 39*     Recent Labs     08/29/20  1045 08/30/20  0419   * 133*   K 4.5 4.1    105   CO2 20* 22*   BUN 31* 25*   * 89     No results for input(s): ALB in the last 72 hours.    Invalid input(s): ALKP, SGOT, SGPT, TBIL, DBIL, TPRO  No results for input(s): PT, INR, PTT in the last 72 hours.      Radiology Review:  US Abdomen Limited   Final Result      Fatty infiltration of the liver      Tiny gallstones without gallbladder wall thickening      6 mm right renal cyst         Electronically signed by: Viry Gold MD   Date:    08/29/2020   Time:    15:21      X-Ray Chest AP Portable   Final Result      CT Chest Abdomen Pelvis W W/O Contrast (XPD)    (Results Pending)         IMPRESSION / RECOMMENDATIONS:  75-year-old gentleman with profound weight loss.  Normal iron levels but profound anemia.  Needs a CT scan of chest abdomen and pelvis to make sure he is not have lymphoma, or underlying disease.  Or malignancy.  He will need another upper endoscopy.  To evaluate healing and to make sure he is not have underlying esophageal cancer.  He has had colonoscopies in the past and has normal iron level so do not think colonoscopy is pressing at this moment but this could be done as an outpatient if no other etiology is found.  With his elevation of his total protein and his renal insufficiency we worry about things such as myeloma or other bone marrow diseases.  Upper endoscopy can be repeated as an outpatient.  He has eaten today.  He is getting blood today.    Thank you for this consult.    Al Polk  8/30/2020  12:22 PM

## 2020-08-30 NOTE — PROGRESS NOTES
[facility] Medicine  Progress Note    Patient Name: Ankit Uribe  MRN: [id number]  Patient Class: IP- Inpatient   Admission Date: 8/29/2020  Length of Stay: 1 days  Attending Physician: Mercedes Vail DO  Primary Care Provider: Ziggy Treviño MD        Subjective:     Principal Problem:Abnormal EKG  Chief Complaint   Patient presents with    Chest Pain     x2 mos, intermittent       Interval History:  Overnight H&H dropped to 6.9/21.8 transfuse 1 unit PRBC with appropriate response to hemoglobin hematocrit 8.2/25.7.  Evaluated by GI who recommended outpatient endoscopy and colonoscopy.  Recommended obtaining CT abdomen pelvis to evaluate for possible lymphoma or underlying malignancy.    Seen by cardiology for chest pain.  Recommends continued outpatient workup once more stable in terms of platelets and blood counts.    Patient seen examined.  Ambulating around room without shortness of breath or chest pain.  States that he feels okay denies abdominal pain, nausea, vomiting.    Review of Systems   Per interval history, all other systems reviewed and negative.     Objective:     Vital Signs (Most Recent):  Temp: 98.9 °F (37.2 °C) (08/30/20 1558)  Pulse: 71 (08/30/20 1558)  Resp: 16 (08/30/20 1558)  BP: (!) 150/68 (08/30/20 1558)  SpO2: 97 % (08/30/20 1558) Vital Signs (24h Range):  Temp:  [98 °F (36.7 °C)-98.9 °F (37.2 °C)] 98.9 °F (37.2 °C)  Pulse:  [66-75] 71  Resp:  [16-18] 16  SpO2:  [95 %-98 %] 97 %  BP: (128-157)/(60-70) 150/68     Weight: 66.8 kg (147 lb 4.3 oz)  Body mass index is 21.13 kg/m².    Intake/Output Summary (Last 24 hours) at 8/30/2020 1813  Last data filed at 8/30/2020 1245  Gross per 24 hour   Intake 538.33 ml   Output 800 ml   Net -261.67 ml      Physical Exam  Constitutional:       General: He is not in acute distress.     Appearance: He is well-developed.   HENT:      Head: Normocephalic.   Neck:      Musculoskeletal: Neck supple.   Cardiovascular:      Rate and  Rhythm: Normal rate and regular rhythm.   Pulmonary:      Effort: No respiratory distress.   Abdominal:      General: Abdomen is flat. There is no distension.      Tenderness: There is no abdominal tenderness.   Musculoskeletal: Normal range of motion.   Skin:     General: Skin is warm.      Findings: No rash.   Neurological:      Mental Status: He is alert and oriented to person, place, and time.   Psychiatric:         Mood and Affect: Mood normal.         Significant Labs:   BMP:   Recent Labs   Lab 08/30/20  0419   GLU 89   *   K 4.1      CO2 22*   BUN 25*   CREATININE 1.5*   CALCIUM 8.0*     CBC:   Recent Labs   Lab 08/29/20  1045 08/30/20  0419 08/30/20  1547   WBC 9.20 6.84  --    HGB 8.4* 6.9* 8.2*   HCT 26.6* 21.8* 25.7*   PLT 49* 39*  --      Cardiac Markers:   Recent Labs   Lab 08/29/20  1045   *     Coagulation: No results for input(s): PT, INR, APTT in the last 48 hours.  Lactic Acid: No results for input(s): LACTATE in the last 48 hours.  Troponin:   Recent Labs   Lab 08/29/20  1045 08/29/20  1525 08/29/20  2116   TROPONINI 0.074* 0.063* 0.102*     TSH:   Recent Labs   Lab 08/30/20  0419   TSH 4.760     Urine Studies:   Recent Labs   Lab 08/30/20  1248   RBCUA 4  4   WBCUA 1  1   BACTERIA Negative  Negative   SQUAMEPITHEL 1  1   HYALINECASTS 5*  5*     All pertinent labs within the past 24 hours have been reviewed.    Significant Imaging: I have reviewed all pertinent imaging results/findings within the past 24 hours.     Ct ab pelvis  IMPRESSION:  1.  Mild emphysematous lung disease.  2.  Minimal bilateral perihilar groundglass opacities may reflect  minimal pneumonitis, atelectasis, or edema.  3.  Mild mediastinal lymphadenopathy. Shotty periaortic lymph nodes  identified in the abdomen.  4.  Splenomegaly.  5.  Mild focal wall thickening of the posterior bladder wall  measuring up to 8 mm in thickness. Consider further evaluation with  cystoscopy.    echo  Mild left ventricular  enlargement.  Normal left ventricular systolic function. The estimated ejection fraction is 55%.  Normal LV diastolic function.  No wall motion abnormalities.  Normal right ventricular systolic function.  Moderate left atrial enlargement.  Mild aortic valve stenosis.  Aortic valve area is 1.63 cm2; peak velocity is 2.41 m/s; mean gradient is 12 mmHg.  Mild-to-moderate mitral regurgitation.  Mild tricuspid regurgitation.  Normal central venous pressure (3 mmHg).  The estimated PA systolic pressure is 48 mmHg        Assessment/Plan:      * Abnormal EKG  CAD (coronary artery disease)  Chest pain  Nonspecific T inversions and ST elevations in anterior leads associated with positive troponins/NSTEMI    Plan   Aspirin, Lovenox will not able to give because of severe thrombocytopenia   patient was on beta-blocker and statin  Cardiology consult, continued outpatient workup  Echocardiogram  trend cardiac enzymes      Bicytopenia  Chronic but worsening . Last plt was 131 down to 39  Associated with weight loss    Plan   Anemia work up   UA for proteins   Close monitor   Consult dr Carlos   Patient may need bone marrow Bx .   Liver US   Previous CT with no cirrhosis findings         Hypertension  Stable . Slightly elevated .Home meds        Weight loss  Possible secondary to esophageal problem and  is not eating  R/o cancer     Plan   CEA , , stool occult blood  GI consult, out patient colonoscopy and egd  CT ab/pelvis: performed. Some wall thicking of bladder. Will need outpt uroloyg follow up .       h/o GI bleed  Currently worsening anemia with possible chronic GI bleed given previous large duodenal ulcer   Recent EGD result done by Dr. Galloway is not in the system, patient states that he was found to have esophagitis and underwent treatment.    Plan   Close monitor hemoglobin  IV PPI  Stool occult blood  S/p 1 unit prbc with no signs of overt bleeding        VTE Risk Mitigation (From admission, onward)          Ordered     IP VTE HIGH RISK PATIENT  Once      08/29/20 1323     Place sequential compression device  Until discontinued      08/29/20 1323                  Mercedes Vail DO  Department of Hospital Medicine   [facility]

## 2020-08-31 NOTE — PROGRESS NOTES
"[facility] Medicine  Progress Note    Patient Name: Ankit Uribe  MRN: [id number]  Patient Class: IP- Inpatient   Admission Date: 8/29/2020  Length of Stay: 2 days  Attending Physician: Mercedes Vail DO  Primary Care Provider: Ziggy Treviño MD        Subjective:     Principal Problem:Abnormal EKG  Chief Complaint   Patient presents with    Chest Pain     x2 mos, intermittent       Interval History:  H&H 7.8/24.2, platelets 37 today.  No signs of overt bleeding.  Hematology-Oncology final recs pending.    Patient seen examined.  Ambulating around room without shortness of breath or chest pain.  States that he feels okay denies abdominal pain, nausea, vomiting.    Review of Systems   Per interval history, all other systems reviewed and negative.     Objective:     Temp:  [98 °F (36.7 °C)-99.7 °F (37.6 °C)] 99.7 °F (37.6 °C)  Pulse:  [61-82] 70  Resp:  [16-20] 20  SpO2:  [95 %-98 %] 97 %  BP: (135-158)/(61-68) 158/68    BP (!) 158/68 (BP Location: Left arm, Patient Position: Sitting)   Pulse 70   Temp 99.7 °F (37.6 °C) (Oral)   Resp 20   Ht 5' 10" (1.778 m)   Wt 66.8 kg (147 lb 4.3 oz)   SpO2 97%   BMI 21.13 kg/m²       Intake/Output Summary (Last 24 hours) at 8/31/2020 1847  Last data filed at 8/31/2020 1246  Gross per 24 hour   Intake 960 ml   Output --   Net 960 ml       Physical Exam  Constitutional:       General: He is not in acute distress.     Appearance: He is well-developed.   HENT:      Head: Normocephalic.   Neck:      Musculoskeletal: Neck supple.   Cardiovascular:      Rate and Rhythm: Normal rate and regular rhythm.   Pulmonary:      Effort: No respiratory distress.   Abdominal:      General: Abdomen is flat. There is no distension.      Tenderness: There is no abdominal tenderness.   Musculoskeletal: Normal range of motion.   Skin:     General: Skin is warm.      Findings: No rash.   Neurological:      Mental Status: He is alert and oriented to person, place, and " time.   Psychiatric:         Mood and Affect: Mood normal.         Significant Labs:   Cbc: h/h 7.8/24.2 plt 37     Recent Labs   Lab 08/29/20  1045 08/29/20  1525 08/29/20  2116   TROPONINI 0.074* 0.063* 0.102*     All pertinent labs within the past 24 hours have been reviewed.    Significant Imaging: I have reviewed all pertinent imaging results/findings within the past 24 hours.     Ct ab pelvis  IMPRESSION:  1.  Mild emphysematous lung disease.  2.  Minimal bilateral perihilar groundglass opacities may reflect  minimal pneumonitis, atelectasis, or edema.  3.  Mild mediastinal lymphadenopathy. Shotty periaortic lymph nodes  identified in the abdomen.  4.  Splenomegaly.  5.  Mild focal wall thickening of the posterior bladder wall  measuring up to 8 mm in thickness. Consider further evaluation with  cystoscopy.    echo  Mild left ventricular enlargement.  Normal left ventricular systolic function. The estimated ejection fraction is 55%.  Normal LV diastolic function.  No wall motion abnormalities.  Normal right ventricular systolic function.  Moderate left atrial enlargement.  Mild aortic valve stenosis.  Aortic valve area is 1.63 cm2; peak velocity is 2.41 m/s; mean gradient is 12 mmHg.  Mild-to-moderate mitral regurgitation.  Mild tricuspid regurgitation.  Normal central venous pressure (3 mmHg).  The estimated PA systolic pressure is 48 mmHg        Assessment/Plan:      * Abnormal EKG  CAD (coronary artery disease)  Chest pain  Nonspecific T inversions and ST elevations in anterior leads associated with positive troponins/NSTEMI    Plan   Aspirin, Lovenox will not able to give because of severe thrombocytopenia   patient was on beta-blocker and statin  Cardiology consult, continued outpatient workup  Echocardiogram  trend cardiac enzymes      Bicytopenia  Chronic but worsening . Last plt was 131 down to 37  Associated with weight loss    Plan   Anemia work up   UA for proteins   Close monitor   Consult   [place],  Patient may need bone marrow Bx .   Liver US   Previous CT with no cirrhosis findings       Hypertension  Stable . Slightly elevated .Home meds        Weight loss  Possible secondary to esophageal problem and  is not eating  R/o cancer     Plan   CEA , , stool occult blood  GI consult, out patient colonoscopy and egd  CT ab/pelvis: performed. Some wall thicking of bladder. Will need outpt uroloyg follow up .       h/o GI bleed  Currently worsening anemia with possible chronic GI bleed given previous large duodenal ulcer   Recent EGD result done by Dr. Galloway is not in the system, patient states that he was found to have esophagitis and underwent treatment.    Plan   Close monitor hemoglobin  IV PPI  Stool occult blood  S/p 1 unit prbc with no signs of overt bleeding    Plan: follow up am labs, follow up heme/onc, outpatient gi and cardiology work up    VTE Risk Mitigation (From admission, onward)         Ordered     IP VTE HIGH RISK PATIENT  Once      08/29/20 1323     Place sequential compression device  Until discontinued      08/29/20 1323                  Mercedes Vail DO  Department of Hospital Medicine   [facility]

## 2020-08-31 NOTE — PLAN OF CARE
Important Message from Medicare was sign, explained and given to patient/caregiver on 08/31/2020 at 12:00pm

## 2020-08-31 NOTE — PROGRESS NOTES
[facility]  Cardiology  Progress Note    Patient Name: Ankit Uribe  MRN: [id number]  Admission Date: 8/29/2020  Hospital Length of Stay: 1 days  Code Status: Full Code   Attending Physician: Mercedes Vail DO   Primary Care Physician: Ziggy Treviño MD  Expected Discharge Date:   Principal Problem:Abnormal EKG    Subjective:       Interval History: Denies any chest pain or SOB. pRBC transfusion underway.  Discussed findings of the echocardiogram with the patient.    ROS  Objective:     Vital Signs (Most Recent):  Temp: 99.3 °F (37.4 °C) (08/30/20 2002)  Pulse: 79 (08/30/20 2002)  Resp: 18 (08/30/20 2002)  BP: (!) 143/61 (08/30/20 2002)  SpO2: 95 % (08/30/20 2002) Vital Signs (24h Range):  Temp:  [98 °F (36.7 °C)-99.3 °F (37.4 °C)] 99.3 °F (37.4 °C)  Pulse:  [66-79] 79  Resp:  [16-18] 18  SpO2:  [95 %-98 %] 95 %  BP: (128-157)/(60-70) 143/61     Weight: 66.8 kg (147 lb 4.3 oz)  Body mass index is 21.13 kg/m².    SpO2: 95 %  O2 Device (Oxygen Therapy): room air      Intake/Output Summary (Last 24 hours) at 8/30/2020 2141  Last data filed at 8/30/2020 1245  Gross per 24 hour   Intake 538.33 ml   Output 800 ml   Net -261.67 ml       Lines/Drains/Airways     Peripheral Intravenous Line                 Peripheral IV - Single Lumen 08/29/20 1045 18 G Right Hand 1 day                Scheduled Meds:   amLODIPine  10 mg Oral Daily    atorvastatin  40 mg Oral Daily    cyanocobalamin  1,000 mcg Subcutaneous Daily    metoprolol tartrate  100 mg Oral Daily    pantoprazole  40 mg Intravenous Daily     Continuous Infusions:   sodium chloride 0.9% 50 mL/hr at 08/29/20 1805     PRN Meds:.sodium chloride, acetaminophen, dextrose 50%, dextrose 50%, glucagon (human recombinant), glucose, glucose, insulin aspart U-100, iohexoL, sodium chloride 0.9%     Physical Exam  HEENT: Normocephalic, atraumatic, PERRL, Conjunctiva pink, no scleral icterus.   CVS: S1S2+, RRR, no murmurs, rubs or gallops, JVP: Normal.  LUNGS:  Clear  ABDOMEN: Soft, NT, BS+  EXTREMITIES: No cyanosis, clubbing or edema  NEURO: AAO X 3.     Significant Labs:   BMP:   Recent Labs   Lab 08/29/20  1045 08/30/20  0419   * 89   * 133*   K 4.5 4.1    105   CO2 20* 22*   BUN 31* 25*   CREATININE 2.0* 1.5*   CALCIUM 8.1* 8.0*   , CMP   Recent Labs   Lab 08/29/20  1045 08/30/20  0419   * 133*   K 4.5 4.1    105   CO2 20* 22*   * 89   BUN 31* 25*   CREATININE 2.0* 1.5*   CALCIUM 8.1* 8.0*   PROT 10.1* 8.7*   ALBUMIN 2.2* 1.8*   BILITOT 0.9 0.7   ALKPHOS 213* 182*   AST 30 30   ALT 21 18   ANIONGAP 5* 6*   ESTGFRAFRICA 37.2* 52.6*   EGFRNONAA 32.2* 45.5*   , CBC   Recent Labs   Lab 08/29/20  1045 08/30/20  0419 08/30/20  1547   WBC 9.20 6.84  --    HGB 8.4* 6.9* 8.2*   HCT 26.6* 21.8* 25.7*   PLT 49* 39*  --    , INR No results for input(s): INR, PROTIME in the last 48 hours., Lipid Panel   Recent Labs   Lab 08/29/20  1525   CHOL 84*   HDL 14*   LDLCALC 43.2*   TRIG 134   CHOLHDL 16.7*    and Troponin   Recent Labs   Lab 08/29/20  1045 08/29/20  1525 08/29/20  2116   TROPONINI 0.074* 0.063* 0.102*       Significant Imaging: Reviewed  Assessment and Plan:     IMPRESSION:  Atypical chest pain. Different from when he had previous MI.   Mildly elevated troponin. No significant rise or fall. Likely etiology is ARF.   Esophageal candidiasis. Reportedly improving.   H/o CAD s/p PCI.   ARF.   Thrombocytopenia.   Anemia.  Acute on chronic.  Arthritis. Migratory. Etiology?   Weight loss.  Etiology?      PLAN:  1. Continue current management.   2. Patient can follow up in clinic and ischemic work up could be scheduled as an outpatient. Patient was given info for follow up.         Elizabeth Chaidez MD  Cardiology  [facility]

## 2020-08-31 NOTE — CONSULTS
"  [facility]  Adult Nutrition   Consult Note (Initial Assessment)     SUMMARY     Recommendations/Interventions:  1. Continue current diet as tolerated, encourage intake.   2. Added Ensure Enlive TID to aid in meeting estimated needs.   3. Honor food preferences to promote adequate intake.  4. RD suspects malnutrition. RD noted clinical characteristics that support a diagnosis of malnutrition. Assessment is based on medical records, interview with patient, and visible physical findings. For patient and clinician safety, physical contact (palpation) was not utilized during assessment due to departmental decision to stop performing NFPE during the COVID-19 pandemic.  1. Severe Protein Calorie Malnutrition in context of acute illness or injury RT esophageal stricture and candida in esophagus AEB < 50% of estimated energy requirements for > 5 days, decrease in appetite and intake, weight loss of 35 lbs (19.23% weight loss in 3 months per patient), and visual signs of muscle and fat loss.    Goals: 1. Patient to meet at least 75% of estimated needs via PO intake of meals and supplements. 2. Malnutrition Diagnosis added to patients problem list.  Nutrition Goal Status: new    Dietitian Rounds Brief:  · Seen 2' consult RE: recent weight loss. Added Ensure Enlive TID to aid in meeting estimated needs. Patients appetite and intake are good now. He is not losing anymore weight. Drinking ensure at home. No complaints other than wanting to go home. Suspect severe malnutrition-encouraged continuing to drink ensure enlive at home. Will continue to monitor intake, labs, and plan of care.  · Per progress notes- patient admitted 2' chest pain x 1 week. Admitted 2' abnormal EKG. "Over the last 3-4 months he has had difficulty swallowing and was living off of a half a baked potato and a piece of dry toast daily.  He lost 30-35 lb down to 150 lb.  Dr. Galloway, his GI physician did an upper endoscopy and found that he had " "an esophageal stricture, this was dilated.  He also had Candida in the esophagus and this was treated with nystatin and Diflucan.  Thereafter his oral intake increased, he takes boost and Ensure also daily.  He says his weight has remained at around 150-152 lb."  Reason for Assessment  Reason For Assessment: consult  Diagnosis: (Abnormal EKG)  Relevant Medical History: Long-term anticoagulant use, CAD, HTN  Interdisciplinary Rounds: did not attend    Nutrition Risk Screen  Nutrition Risk Screen: (recent weight)    Nutrition/Diet History  Food Allergies: NKFA  Factors Affecting Nutritional Intake: None identified at this time    Anthropometrics  Temp: 98.6 °F (37 °C)  Height Method: Stated  Height: 5' 10" (177.8 cm)  Height (inches): 70 in  Weight Method: Standard Scale  Weight: 66.8 kg (147 lb 4.3 oz)  Weight (lb): 147.27 lb  Ideal Body Weight (IBW), Male: 166 lb  % Ideal Body Weight, Male (lb): 88.72 %  BMI (Calculated): 21.1  BMI Grade: 18.5-24.9 - normal  Weight Loss: unintentional  Usual Body Weight (UBW), k.7 kg  Weight Change Amount: 35 lb  % Usual Body Weight: 80.94  % Weight Change From Usual Weight: -19.23 %     Weight History:  Wt Readings from Last 10 Encounters:   20 66.8 kg (147 lb 4.3 oz)   14 81.6 kg (179 lb 12.8 oz)     Lab/Procedures/Meds: Pertinent Labs Reviewed  Clinical Chemistry:  Recent Labs   Lab 20  0419   *   K 4.1      CO2 22*   GLU 89   BUN 25*   CREATININE 1.5*   CALCIUM 8.0*   PROT 8.7*   ALBUMIN 1.8*   BILITOT 0.7   ALKPHOS 182*   AST 30   ALT 18   ANIONGAP 6*   ESTGFRAFRICA 52.6*   EGFRNONAA 45.5*     CBC:   Recent Labs   Lab 20  0416   WBC 6.78   RBC 2.35*   HGB 7.8*   HCT 24.2*   PLT 37*   *   MCH 33.2*   MCHC 32.2     Lipid Panel:  Recent Labs   Lab 20  1525   CHOL 84*   HDL 14*   LDLCALC 43.2*   TRIG 134   CHOLHDL 16.7*     Cardiac Profile:  Recent Labs   Lab 20  1045 20  1525 20  2116   *  --   --  "   CPK 13*  --   --    CPKMB 1.4  --   --    TROPONINI 0.074* 0.063* 0.102*     Inflammatory Labs:  Recent Labs   Lab 08/29/20  2116   CRP 3.96*     Thyroid & Parathyroid:  Recent Labs   Lab 08/30/20  0419   TSH 4.760     Medications: Pertinent Medications reviewed  Scheduled Meds:   amLODIPine  10 mg Oral Daily    atorvastatin  40 mg Oral Daily    metoprolol tartrate  100 mg Oral Daily    pantoprazole  40 mg Intravenous Daily     Continuous Infusions:   sodium chloride 0.9% 50 mL/hr at 08/29/20 1805     PRN Meds:.sodium chloride, acetaminophen, dextrose 50%, dextrose 50%, glucagon (human recombinant), glucose, glucose, insulin aspart U-100, iohexoL, sodium chloride 0.9%    Estimated/Assessed Needs    Weight Used For Calorie Calculations: 66.8 kg (147 lb 4.3 oz)  Energy Calorie Requirements (kcal): 6459-7809 kcals/day (25-30 kcals/kg)  Energy Need Method: Kcal/kg  Protein Requirements: 54-67 g/day (0.8-1.0 g/kg 2' ALEXEI)  Weight Used For Protein Calculations: 66.8 kg (147 lb 4.3 oz)     Estimated Fluid Requirement Method: RDA Method    Nutrition Prescription Ordered    Current Diet Order: Cardiac Diet    Evaluation of Received Nutrient/Fluid Intake    Energy Calories Required: not meeting needs  Protein Required: not meeting needs  Fluid Required: meeting needs  Tolerance: tolerating  % Intake of Estimated Energy Needs: 75 - 100 %  % Meal Intake: 75 - 100 %    Intake/Output Summary (Last 24 hours) at 8/31/2020 1244  Last data filed at 8/31/2020 0900  Gross per 24 hour   Intake 1018.33 ml   Output --   Net 1018.33 ml      Nutrition Risk    Level of Risk/Frequency of Follow-up: high   Monitor and Evaluation    Food and Nutrient Intake: energy intake, food and beverage intake  Food and Nutrient Adminstration: diet order  Physical Activity and Function: nutrition-related ADLs and IADLs  Anthropometric Measurements: weight, body mass index, weight change  Biochemical Data, Medical Tests and Procedures: electrolyte and  renal panel, lipid profile, gastrointestinal profile, inflammatory profile, glucose/endocrine profile  Nutrition-Focused Physical Findings: overall appearance     Nutrition Follow-Up    RD Follow-up?: Yes  Itzel Ochoa RD 08/31/2020 12:46 PM

## 2020-08-31 NOTE — PLAN OF CARE
Problem: Adult Inpatient Plan of Care  Goal: Plan of Care Review  Outcome: Ongoing, Progressing  Goal: Patient-Specific Goal (Individualization)  Outcome: Ongoing, Progressing  Goal: Absence of Hospital-Acquired Illness or Injury  Outcome: Ongoing, Progressing  Goal: Optimal Comfort and Wellbeing  Outcome: Ongoing, Progressing  Goal: Readiness for Transition of Care  Outcome: Ongoing, Progressing  Goal: Rounds/Family Conference  Outcome: Ongoing, Progressing     Problem: Chest Pain  Goal: Resolution of Chest Pain Symptoms  Outcome: Ongoing, Progressing     Problem: Anemia  Goal: Anemia Symptom Improvement  Outcome: Ongoing, Progressing     Problem: Fall Injury Risk  Goal: Absence of Fall and Fall-Related Injury  Outcome: Ongoing, Progressing

## 2020-08-31 NOTE — PHYSICIAN QUERY
PT Name: Ankit Uribe  MR #: [id number]     Documentation Clarification      CDS/: Bryson Diallo               Contact information: [phone]    This form is a permanent document in the medical record.     Query Date: August 31, 2020    By submitting this query, we are merely seeking further clarification of documentation. Please utilize your independent clinical judgment when addressing the question(s) below.    The Medical Record reflects the following:    Supporting Clinical Findings Location in Medical Record     Principal Problem:Abnormal EKG       Chief Complaint   Patient presents with    Chest Pain       x2 mos, intermittent     Interval History:  Overnight H&H dropped to 6.9/21.8 transfuse 1 unit PRBC with appropriate response to hemoglobin hematocrit 8.2/25.7.  Evaluated by GI who recommended outpatient endoscopy and colonoscopy.  Recommended obtaining CT abdomen pelvis to evaluate for possible lymphoma or underlying malignancy.     Seen by cardiology for chest pain.  Recommends continued outpatient workup once more stable in terms of platelets and blood     * Abnormal EKG  CAD (coronary artery disease)  Chest pain  Nonspecific T inversions and ST elevations in anterior leads associated with positive troponins/NSTEMI   8/30 Dr. Vail     Troponin I 0.074 --> 0.063 --> 0.102       labs                                                                            Dear Dr. Vail, please clarify the type of NSTEMI:    Please specify type of myocardial infarction:        »   Type 1 Non-ST elevation MI      »   Type 2 MI (due to demand ischemia or secondary to ischemic imbalance)      »   Demand Ischemia without acute myocardial infarction      »   Other (please specify)_____________      »   Unknown      »   Clinically unable to determine    PROVIDER USE:       Demand Ischemia without acute myocardial infarction

## 2020-09-01 NOTE — PLAN OF CARE
Problem: Adult Inpatient Plan of Care  Goal: Plan of Care Review  Outcome: Ongoing, Progressing  Goal: Patient-Specific Goal (Individualization)  Outcome: Ongoing, Progressing  Goal: Absence of Hospital-Acquired Illness or Injury  Outcome: Ongoing, Progressing  Intervention: Identify and Manage Fall Risk  Flowsheets (Taken 9/1/2020 1800)  Safety Promotion/Fall Prevention:   assistive device/personal item within reach   bed alarm refused   Fall Risk reviewed with patient/family   nonskid shoes/socks when out of bed   instructed to call staff for mobility  Intervention: Prevent VTE (venous thromboembolism)  Flowsheets (Taken 9/1/2020 1800)  VTE Prevention/Management: ambulation promoted  Goal: Optimal Comfort and Wellbeing  Outcome: Ongoing, Progressing  Goal: Readiness for Transition of Care  Outcome: Ongoing, Progressing  Goal: Rounds/Family Conference  Outcome: Ongoing, Progressing     Problem: Chest Pain  Goal: Resolution of Chest Pain Symptoms  Outcome: Ongoing, Progressing     Problem: Anemia  Goal: Anemia Symptom Improvement  Outcome: Ongoing, Progressing     Problem: Fall Injury Risk  Goal: Absence of Fall and Fall-Related Injury  Outcome: Ongoing, Progressing  Intervention: Identify and Manage Contributors to Fall Injury Risk  Flowsheets (Taken 9/1/2020 1800)  Self-Care Promotion:   independence encouraged   BADL personal objects within reach  Medication Review/Management: medications reviewed  Intervention: Promote Injury-Free Environment  Flowsheets (Taken 9/1/2020 1800)  Safety Promotion/Fall Prevention:   assistive device/personal item within reach   bed alarm refused   Fall Risk reviewed with patient/family   nonskid shoes/socks when out of bed   instructed to call staff for mobility  Environmental Safety Modification:   assistive device/personal items within reach   clutter free environment maintained   room organization consistent

## 2020-09-01 NOTE — PROGRESS NOTES
Macrocytic anemia, pancytopenia.  .  H/H 7.8/24.2, pRBC given today.      B 12 deficiency.  Started on B 12 injections.    Plt Cnt 37,000.  Check for ITP, HIT, TTP.  PBS review. Haptoglobin,NL at 237, retic  4.1, epo. Pending.    Renal insufficiency and increased protein.  Creat clear and protein studies for Myeloma? Are pending.

## 2020-09-01 NOTE — PROGRESS NOTES
[facility] Medicine  Progress Note    Patient Name: Ankit Uribe  MRN: [id number]  Patient Class: IP- Inpatient   Admission Date: 8/29/2020  Length of Stay: 3 days  Attending Physician: Oziel Logan MD  Primary Care Provider: Ziggy Treviño MD  Date of service:  09/01/2020    Subjective:     Principal Problem:Abnormal EKG  Chief Complaint   Patient presents with    Chest Pain     x2 mos, intermittent       Interval History:    Today the patient reports some persistent pain of chest, mild intensity, constant timing, not improved with Tylenol.  Eating okay.  No abdominal pain, nausea, or vomiting.  Denies any bleeding.    Objective:     Physical exam  Vital signs reviewed  General:  Comfortable appearing, frail, no apparent distress, nontoxic  Head and eyes:  Anicteric sclerae, no conjunctival discharge  ENT:  Moist mucous membranes, no thrush  Cardiovascular:  2+ radial pulses, regular rate and rhythm  Pulmonary:  Comfortable work of breathing, lungs are clear  GI:  Abdomen is soft and nontender, nondistended  Skin:  Dry and warm no jaundice  Psych:  Mood is calm, affect normal, insight fair  Neuro:  Nonfocal motor exam, fluent speech, alert and oriented    Significant Labs:   Hemoglobin 8.1  Hematocrit 25  Platelets 31    CT ABD/Pelvis IMPRESSION:  1.  Mild emphysematous lung disease.  2.  Minimal bilateral perihilar groundglass opacities may reflect minimal pneumonitis, atelectasis, or edema.   3.  Mild mediastinal lymphadenopathy. Shotty periaortic lymph nodes identified in the abdomen.  4.  Splenomegaly.  5.  Mild focal wall thickening of the posterior bladder wall measuring up to 8 mm in thickness. Consider further evaluation with cystoscopy.    Echocardiogram Impression:  Mild left ventricular enlargement.  Normal left ventricular systolic function. The estimated ejection fraction is 55%.  Normal LV diastolic function.  No wall motion abnormalities.  Normal right ventricular  systolic function.  Moderate left atrial enlargement.  Mild aortic valve stenosis.  Aortic valve area is 1.63 cm2; peak velocity is 2.41 m/s; mean gradient is 12 mmHg.  Mild-to-moderate mitral regurgitation.  Mild tricuspid regurgitation.  Normal central venous pressure (3 mmHg).  The estimated PA systolic pressure is 48 mmHg    Abdominal Ultrasound Impression:  Fatty infiltration of the liver  Tiny gallstones without gallbladder wall thickening  6 mm right renal cyst    Assessment/Plan:      Chest pain  Elevated troponin  Abnormal EKG  CAD status post stent  Nonspecific T inversions and ST elevations in anterior leads associated with positive troponins/NSTEMI    Plan   Aspirin, Lovenox will not able to give because of severe thrombocytopenia   patient was on beta-blocker and statin  Cardiology consult, continued outpatient workup  Echocardiogram  trend cardiac enzymes      Bicytopenia  Chronic but worsening . Last plt was 131 down to 37  Associated with weight loss    Plan   Anemia work up   UA for proteins   Close monitor   Consult dr Thibodeaux,  Patient may need bone marrow Bx .   Liver US   Previous CT with no cirrhosis findings       Hypertension  Stable . Slightly elevated .Home meds        Weight loss  Possible secondary to esophageal problem and  is not eating  R/o cancer     Plan   CEA , , stool occult blood  GI consult, out patient colonoscopy and egd  CT ab/pelvis: performed. Some wall thicking of bladder. Will need outpt uroloyg follow up .       h/o GI bleed  Currently worsening anemia with possible chronic GI bleed given previous large duodenal ulcer   Recent EGD result done by Dr. Galloway is not in the system, patient states that he was found to have esophagitis and underwent treatment.    Plan   Close monitor hemoglobin  IV PPI  Stool occult blood  S/p 1 unit prbc with no signs of overt bleeding    Plan: follow up am labs, follow up heme/onc, outpatient gi and cardiology work up    Plan update  today:  Continue supportive care including pain control and antiemetics as needed.  Trial of tramadol for pain control  Appreciate consultants.  I discussed the case with Heme-Onc Dr. Robbins.  Abnormal findings on peripheral smear review with pathology.  Awaiting flow cytometry results, likely tomorrow.  May need bone marrow.  Serial labs.  Continue to monitor platelets and H&H.   The patient continues to have some chest pain intermittently.  Cardiology consultation ordered.  May need stress test in near future?  Patient is eating well.  Advance diet as tolerated.  Discontinue IV fluids.  Mobilize as able  Continue home medications for chronic issues as able  Will need close outpatient Urology follow-up  Outpatient GI follow-up.  Continue PPI.  VTE prophylaxis with SCDs    VTE Risk Mitigation (From admission, onward)         Ordered     IP VTE HIGH RISK PATIENT  Once      08/29/20 1323     Place sequential compression device  Until discontinued      08/29/20 1323                  Oziel Logan MD  Department of Hospital Medicine   [facility]

## 2020-09-02 NOTE — PROGRESS NOTES
[facility] Medicine  Progress Note    Patient Name: Ankit Uribe  MRN: [id number]  Patient Class: IP- Inpatient   Admission Date: 8/29/2020  Length of Stay: 4 days  Attending Physician: Oziel Logan MD  Primary Care Provider: Ziggy Trevñio MD  Date of service:  09/02/2020    Subjective:     Principal Problem:Abnormal EKG  Chief Complaint   Patient presents with    Chest Pain     x2 mos, intermittent       Interval History:    Today the patient reports feeling better with no persistent chest pains which have resolved without recurrence today.  No shortness of breath, no nausea vomiting.  He is eating well.  He is mobilizing.  He is hoping to go home later today.  He understands we are waiting flow cytometry results from oncology to see if additional tests are needed prior to discharge.    Objective:     Physical exam  Vital signs reviewed  General:  Comfortable appearing, frail, no apparent distress, nontoxic  Head and eyes:  Anicteric sclerae, no conjunctival discharge  ENT:  Moist mucous membranes, no thrush  Cardiovascular:  2+ radial pulses, regular rate and rhythm  Pulmonary:  Comfortable work of breathing, lungs are clear  GI:  Abdomen is soft and nontender, nondistended    Significant Labs:   Platelets 39  Hemoglobin 8.2  Hematocrit 25  Sodium 131  Creatinine 1.5    CT ABD/Pelvis IMPRESSION:  1.  Mild emphysematous lung disease.  2.  Minimal bilateral perihilar groundglass opacities may reflect minimal pneumonitis, atelectasis, or edema.   3.  Mild mediastinal lymphadenopathy. Shotty periaortic lymph nodes identified in the abdomen.  4.  Splenomegaly.  5.  Mild focal wall thickening of the posterior bladder wall measuring up to 8 mm in thickness. Consider further evaluation with cystoscopy.    Echocardiogram Impression:  Mild left ventricular enlargement.  Normal left ventricular systolic function. The estimated ejection fraction is 55%.  Normal LV diastolic function.  No  wall motion abnormalities.  Normal right ventricular systolic function.  Moderate left atrial enlargement.  Mild aortic valve stenosis.  Aortic valve area is 1.63 cm2; peak velocity is 2.41 m/s; mean gradient is 12 mmHg.  Mild-to-moderate mitral regurgitation.  Mild tricuspid regurgitation.  Normal central venous pressure (3 mmHg).  The estimated PA systolic pressure is 48 mmHg    Abdominal Ultrasound Impression:  Fatty infiltration of the liver  Tiny gallstones without gallbladder wall thickening  6 mm right renal cyst    Assessment/Plan:      Chest pain  Elevated troponin  Abnormal EKG  CAD status post stent  Nonspecific T inversions and ST elevations in anterior leads associated with positive troponins/NSTEMI    Plan   Aspirin, Lovenox will not able to give because of severe thrombocytopenia   patient was on beta-blocker and statin  Cardiology consult, continued outpatient workup  Echocardiogram  trend cardiac enzymes      Bicytopenia  Chronic but worsening . Last plt was 131 down to 37  Associated with weight loss    Plan   Anemia work up   UA for proteins   Close monitor   Consult dr Thibodeaux,  Patient may need bone marrow Bx .   Liver US   Previous CT with no cirrhosis findings       Hypertension  Stable . Slightly elevated .Home meds    Severe protein calorie malnutrition  Possible secondary to esophageal problem and  is not eating  R/o cancer     Plan   CEA , , stool occult blood  GI consult, out patient colonoscopy and egd  CT ab/pelvis: performed. Some wall thicking of bladder. Will need outpt uroloyg follow up .       h/o GI bleed  Currently worsening anemia with possible chronic GI bleed given previous large duodenal ulcer   Recent EGD result done by Dr. Galloway is not in the system, patient states that he was found to have esophagitis and underwent treatment.    Plan   Close monitor hemoglobin  IV PPI  Stool occult blood  S/p 1 unit prbc with no signs of overt bleeding    Plan: follow up am labs,  follow up heme/onc, outpatient gi and cardiology work up    Plan update today:  Continue supportive care including pain control and antiemetics as needed.  Continue tramadol for pain control  Appreciate consultants.  Awaiting oncology follow-up with Dr. Robbins for flow cytometry results, possible bone marrow versus discharge home.  Workup of abnormal peripheral smear, possible atypical lymphocytes.  Serial labs.  Continue to monitor platelets and H&H.   Patient is eating well.  Advance diet as tolerated.  Discontinue IV fluids.  Mobilize as able  Continue home medications for chronic issues as able  Will need close outpatient Urology follow-up  Outpatient cardiology follow-up for stress testing  Outpatient GI follow-up.  Continue PPI.  VTE prophylaxis with SCDs  Moderate risk secondary to prescription drug management, multiple medical comorbid conditions and multiple medications managed  Disposition:  Possible discharge next 24 hours.    VTE Risk Mitigation (From admission, onward)         Ordered     IP VTE HIGH RISK PATIENT  Once      08/29/20 1323     Place sequential compression device  Until discontinued      08/29/20 1323                  Oziel Logan MD  Department of Hospital Medicine   [facility]

## 2020-09-02 NOTE — NURSING
Instructed patient on Creatinine Clearance timed 24 hour test. Patient refused stated I am not doing that because I am going home in the morning with or without order. Patient stated he needed to get back home to care for his wife that recently broke her ankle and she is home alone. Charge nurse notified.

## 2020-09-02 NOTE — PROGRESS NOTES
Feeling stronger, eating better.  Given pRBC.  plt cnt in the 30,000 range.  PBS reviewed with Pathologist.  Atypical lymphocytes seen, raises question of blasts.  Flow cytometry ordered on peripheral blood.  Try to have some results tomorrow.  May come to bone marrow exam.  Discussed with Dr. Logan.

## 2020-09-02 NOTE — NURSING
Pt depart ama. Pt educated on risks vs benefits of medical treatment and pt still refuse and depart.

## 2020-09-03 LAB
PLT AB: GP IA/IIA: NEGATIVE
PLT AB: GP IB/IX: NEGATIVE
PLT AB: GP IIB/IIIA: NEGATIVE
PLT AB: GP IV: NEGATIVE
PLT AB: HLA CLASS 1: NEGATIVE

## 2020-09-03 NOTE — DISCHARGE SUMMARY
[facility] Medicine  Discharge Summary      Patient Name: Ankit Uribe  MRN: [id number]  Admission Date: 8/29/2020  Discharge Date and Time: 9/2/2020  4:58 PM  Discharging Provider: Oziel Logan MD  Primary Care Provider: Ziggy Treviño MD    HPI:   75-year-old male patient with history of Acute GI bleed (duodenal ulcer),Anemia due to acute blood loss s/p PRBC,chronic Thrombocytopenia  ,  Hypertension  ,Hyperlipidemia  ,CAD s/p stent placement 17  Years ago came left-sided chest pain.  He had history of GI bleeding in 2014 and scope was done with large duodenal ulcer but he better since then.  But he was having problem with swallowing and pain in the esophagus and recently did endoscope with Dr. Galloway and found esophageal stricture and also possible esophagitis and started Diflucan and he was feeling better at this moment .  At the same time he was losing a lot of weights nearly 20 lb over few months period .  He also admits that he is under lot of stress and his wife is sick ankle fracture.  His cardiac status was being stable for many years and he was off  on aspirin/Plavix.  His chest pain is on the left side, intermittent, no radiation to the shoulder or the jaw and no association with diaphoresis.  Workup was done in emergency room and showed worsening anemia, new changes in the EKG with nonspecific T-wave inversion and minimal ST depression in anterior leads but no ST elevations.  On examination patient is not in respiratory or painful distress, abdomen is soft nontender and no lymphadenopathies.  He is  a chronic smoker.    Hospital Course:   The patient was admitted to the hospital for workup and treatment of chest pain.  Consultation was obtained with Cardiology, GI, and Heme-Onc.  The patient had abnormal EKG and troponin.  Echocardiogram as below.  Cardiology recommends continuing cardiac workup as outpatient with further ischemic workup out of clinic.  CT scan revealed  some focal thickening of posterior bladder wall which will need outpatient urology follow-up.  GI performed abdominal ultrasound as below; GI wishes to defer endoscopic evaluation to outpatient setting and focus on Heme-Onc therapies at this time.  Due to anemia the patient received blood transfusion during hospital stay.  Hematology workup included peripheral blood smear which revealed some abnormal possible atypical lymphocytes.  Subsequently flow cytometry was performed.  We were awaiting results of flow cytometry today to decide on possible bone marrow biopsy, with the patient became unhappy with hospital care left the hospital against medical advice.  The patient had been seen earlier in the day, however at this time the patient left without receiving full discharge instructions and appropriate referrals.  I discussed the case with Dr. Robbins who will reach out to the patient for outpatient follow-up.     CT ABD/Pelvis IMPRESSION:  1.  Mild emphysematous lung disease.  2.  Minimal bilateral perihilar groundglass opacities may reflect minimal pneumonitis, atelectasis, or edema.   3.  Mild mediastinal lymphadenopathy. Shotty periaortic lymph nodes identified in the abdomen.  4.  Splenomegaly.  5.  Mild focal wall thickening of the posterior bladder wall measuring up to 8 mm in thickness. Consider further evaluation with cystoscopy.     Echocardiogram Impression:  Mild left ventricular enlargement.  Normal left ventricular systolic function. The estimated ejection fraction is 55%.  Normal LV diastolic function.  No wall motion abnormalities.  Normal right ventricular systolic function.  Moderate left atrial enlargement.  Mild aortic valve stenosis.  Aortic valve area is 1.63 cm2; peak velocity is 2.41 m/s; mean gradient is 12 mmHg.  Mild-to-moderate mitral regurgitation.  Mild tricuspid regurgitation.  Normal central venous pressure (3 mmHg).  The estimated PA systolic pressure is 48 mmHg     Abdominal Ultrasound  Impression:  Fatty infiltration of the liver  Tiny gallstones without gallbladder wall thickening  6 mm right renal cyst    Consults:   Consults (From admission, onward)        Status Ordering Provider     Inpatient consult to Gastroenterology  Once     Provider:  Al Polk MD    Completed KRYS MATSON     Inpatient consult to Hematology Oncology  Once     Provider:  KRYS Matson MD    Completed DARVIN PHOENIX     Inpatient consult to Registered Dietitian/Nutritionist  Once     Provider:  (Not yet assigned)    Completed DARVIN PHOENIX        Discharge diagnoses:   Abnormal peripheral blood smear suspect underlying malignancy  Acute kidney injury  Thrombocytopenia  Anemia  Hyponatremia  Severe protein calorie malnutrition with weight loss  Chest pain with Elevated troponin and Abnormal EKG, likely demand ischemia secondary to multiple underlying issues including acute kidney injury and possible underlying malignancy  Steatohepatitis  Simple right renal cyst  Cholelithiasis without cholecystitis  CAD status post stent   Moderate mitral insufficiency  Hypertension  History of GI bleed from duodenal ulcer  Additional discharge Diagnoses:    Diagnosis Date Noted POA    PRINCIPAL PROBLEM:  Abnormal EKG [R94.31] 08/29/2020 Unknown    Weight loss [R63.4] 08/29/2020 Unknown    Chest pain [R07.9] 08/29/2020 Unknown    CAD (coronary artery disease) [I25.10] 08/29/2020 Unknown    Hypertension [I10] 08/29/2020 Unknown    Bicytopenia [D75.89] 08/29/2020 Unknown    h/o GI bleed [K92.2] 07/26/2014 Yes     Discharged Condition: against medical advice    Disposition: Left Against Medical Adv*    Oziel Logan MD  Department of Hospital Medicine  [facility]

## 2020-09-03 NOTE — PROGRESS NOTES
Hgb equilabrated at 8.2.  WBC increased 11,000.  Absolute lymphocytosis per diff.  Plt Cnt stable 39,000.    S/P B 12 x's 3 days.    PBS atypical lymphocytes.  Discussed with Dr. Mejia of pathology.  Flow Cytometry verbal report blasts present in peripheral blood.  Final report pending.    Raises concern for primary bone marrow disease.  Consider MDS, leukemia, infiltrative dx.    I went by to see pt this pm to discuss update, and discuss  Bone marrow aspiration and bx to get to Dx.    He has left the hospital against medical advise.    I spoke with Dr. Logan.  He had seen the pt and tried to convince him to remain in hospital.  I think the pt had to go home to check on his wife.    Await flow cytometry report.    Will try to get pt to return, for discussion of bone marrow test to try make diagnosis regards increasing WBC count and pancytopenia.

## 2020-09-04 LAB — VWF CP ACT/NOR PPP CHRO: 38.8 %

## 2020-09-07 LAB
MISCELLANEOUS TEST NAME: NORMAL
REFERENCE LAB: NORMAL
SPECIMEN TYPE: NORMAL
TEST RESULT: NORMAL

## 2020-09-07 NOTE — PHYSICIAN QUERY
PT Name: Ankit Uribe  MR #: [id number]     Documentation Clarification      CDS/: Bryson Diallo               Contact information: [phone]    This form is a permanent document in the medical record.     Query Date: September 7, 2020    By submitting this query, we are merely seeking further clarification of documentation. Please utilize your independent clinical judgment when addressing the question(s) below.    The Medical Record reflects the following:    Supporting Clinical Findings Location in Medical Record     Acute Kidney Injury     ED note     Demand Ischemia       Dr. Vail on Query Form                                                                            Dear Dr. Logan, please clarify if you agree with the diagnoses of ALEXEI and/or Demand Ischemia:    [   ] Agree with ALEXEI only   [   ] Agree with Demand Ischemia only   [X   ] Agree with ALEXEI and Demand Ischemia   [   ] Disagree (please clarify):    [  ] Clinically undetermined                                                                                                           Discharge summary addended to include both diagnoses as above

## 2020-09-13 ENCOUNTER — DOCUMENTATION ONLY (OUTPATIENT)
Dept: HEMATOLOGY/ONCOLOGY | Facility: CLINIC | Age: 75
End: 2020-09-13

## 2020-09-14 NOTE — PROGRESS NOTES
Heme/Onc    On 9/3/20 I called pt's home to talk with him about checking lab and coming into office 9/4.    I spoke with his grand daughter and his sister in law.  He drove himself home from the hospital.  It appears he was weaving on the way home, and was pulled over by police.  A tow truck was called, the police left, and he drove on home, per his family.  He arrived home 7:00 pm.    He appeared very weak, they assisted him to bed.    His sister in law commented that she did not think he would make it through the night.    He got up once for a glass of water.    He got up again to go to the bathroom around 9:00 pm.  He called to his wife to call 911, and dropped to the floor,  Apparently dead.  His sister in law was called over and started CPR.      Ambulance was called.  I think they worked on him for 2 hours, he was not revived.     came and released the body.    I told them that we were working on possible leukemia Dx.    I suspect he had AMI, given his cardiac hx.

## 2020-10-05 LAB
MISCELLANEOUS TEST NAME: NORMAL
REFERENCE LAB: NORMAL
SPECIMEN TYPE: NORMAL
TEST RESULT: NORMAL

## 2020-10-06 LAB — ADAMTS13 EVALUATION COMMENT 2: NORMAL

## 2020-10-07 ENCOUNTER — TELEPHONE (OUTPATIENT)
Dept: SMOKING CESSATION | Facility: CLINIC | Age: 75
End: 2020-10-07